# Patient Record
Sex: MALE | Race: BLACK OR AFRICAN AMERICAN
[De-identification: names, ages, dates, MRNs, and addresses within clinical notes are randomized per-mention and may not be internally consistent; named-entity substitution may affect disease eponyms.]

---

## 2024-04-18 ENCOUNTER — HOSPITAL ENCOUNTER (OUTPATIENT)
Facility: HOSPITAL | Age: 44
End: 2024-04-18
Payer: MEDICAID

## 2024-04-18 ENCOUNTER — HOSPITAL ENCOUNTER (OUTPATIENT)
Facility: HOSPITAL | Age: 44
Setting detail: OBSERVATION
Discharge: HOME | End: 2024-04-18
Attending: INTERNAL MEDICINE | Admitting: INTERNAL MEDICINE
Payer: MEDICAID

## 2024-04-18 VITALS
DIASTOLIC BLOOD PRESSURE: 101 MMHG | RESPIRATION RATE: 18 BRPM | OXYGEN SATURATION: 97 % | HEART RATE: 85 BPM | TEMPERATURE: 98.4 F | SYSTOLIC BLOOD PRESSURE: 161 MMHG

## 2024-04-18 VITALS
BODY MASS INDEX: 41.28 KG/M2 | WEIGHT: 263 LBS | HEART RATE: 74 BPM | OXYGEN SATURATION: 98 % | DIASTOLIC BLOOD PRESSURE: 95 MMHG | RESPIRATION RATE: 17 BRPM | HEIGHT: 67 IN | TEMPERATURE: 97.8 F | SYSTOLIC BLOOD PRESSURE: 139 MMHG

## 2024-04-18 DIAGNOSIS — N18.4 CHRONIC KIDNEY DISEASE, STAGE IV (SEVERE) (HCC): ICD-10-CM

## 2024-04-18 DIAGNOSIS — D63.1 ANEMIA OF CHRONIC KIDNEY FAILURE, UNSPECIFIED STAGE: ICD-10-CM

## 2024-04-18 DIAGNOSIS — R80.9 PROTEINURIA, UNSPECIFIED TYPE: ICD-10-CM

## 2024-04-18 DIAGNOSIS — I10 ESSENTIAL HYPERTENSION, MALIGNANT: ICD-10-CM

## 2024-04-18 DIAGNOSIS — N18.9 ANEMIA OF CHRONIC KIDNEY FAILURE, UNSPECIFIED STAGE: ICD-10-CM

## 2024-04-18 LAB
ABO + RH BLD: NORMAL
BLOOD GROUP ANTIBODIES SERPL: NORMAL
COMMENT:: NORMAL
ERYTHROCYTE [DISTWIDTH] IN BLOOD BY AUTOMATED COUNT: 13.3 % (ref 11.5–14.5)
HCT VFR BLD AUTO: 31.9 % (ref 36.6–50.3)
HCT VFR BLD AUTO: 35.5 % (ref 36.6–50.3)
HGB BLD-MCNC: 11.4 G/DL (ref 12.1–17)
HGB BLD-MCNC: 12.4 G/DL (ref 12.1–17)
INR PPP: 1 (ref 0.9–1.1)
MCH RBC QN AUTO: 28.6 PG (ref 26–34)
MCHC RBC AUTO-ENTMCNC: 34.9 G/DL (ref 30–36.5)
MCV RBC AUTO: 82 FL (ref 80–99)
NRBC # BLD: 0 K/UL (ref 0–0.01)
NRBC BLD-RTO: 0 PER 100 WBC
PLATELET # BLD AUTO: 365 K/UL (ref 150–400)
PMV BLD AUTO: 9.2 FL (ref 8.9–12.9)
PROTHROMBIN TIME: 10.2 SEC (ref 9–11.1)
RBC # BLD AUTO: 4.33 M/UL (ref 4.1–5.7)
SPECIMEN EXP DATE BLD: NORMAL
SPECIMEN HOLD: NORMAL
WBC # BLD AUTO: 8.7 K/UL (ref 4.1–11.1)

## 2024-04-18 PROCEDURE — 86900 BLOOD TYPING SEROLOGIC ABO: CPT

## 2024-04-18 PROCEDURE — 85027 COMPLETE CBC AUTOMATED: CPT

## 2024-04-18 PROCEDURE — 85610 PROTHROMBIN TIME: CPT

## 2024-04-18 PROCEDURE — 6370000000 HC RX 637 (ALT 250 FOR IP): Performed by: INTERNAL MEDICINE

## 2024-04-18 PROCEDURE — 85018 HEMOGLOBIN: CPT

## 2024-04-18 PROCEDURE — 6370000000 HC RX 637 (ALT 250 FOR IP): Performed by: NURSE PRACTITIONER

## 2024-04-18 PROCEDURE — 86901 BLOOD TYPING SEROLOGIC RH(D): CPT

## 2024-04-18 PROCEDURE — 88305 TISSUE EXAM BY PATHOLOGIST: CPT

## 2024-04-18 PROCEDURE — 86850 RBC ANTIBODY SCREEN: CPT

## 2024-04-18 PROCEDURE — 99153 MOD SED SAME PHYS/QHP EA: CPT

## 2024-04-18 PROCEDURE — 6360000002 HC RX W HCPCS: Performed by: PHYSICIAN ASSISTANT

## 2024-04-18 PROCEDURE — G0378 HOSPITAL OBSERVATION PER HR: HCPCS

## 2024-04-18 PROCEDURE — 36415 COLL VENOUS BLD VENIPUNCTURE: CPT

## 2024-04-18 PROCEDURE — G0379 DIRECT REFER HOSPITAL OBSERV: HCPCS

## 2024-04-18 PROCEDURE — 85014 HEMATOCRIT: CPT

## 2024-04-18 RX ORDER — ACETAMINOPHEN 500 MG
1000 TABLET ORAL ONCE
Status: COMPLETED | OUTPATIENT
Start: 2024-04-18 | End: 2024-04-18

## 2024-04-18 RX ORDER — HYDROXYZINE HYDROCHLORIDE 25 MG/1
25 TABLET, FILM COATED ORAL PRN
COMMUNITY

## 2024-04-18 RX ORDER — MIDAZOLAM HYDROCHLORIDE 2 MG/2ML
INJECTION, SOLUTION INTRAMUSCULAR; INTRAVENOUS PRN
Status: COMPLETED | OUTPATIENT
Start: 2024-04-18 | End: 2024-04-18

## 2024-04-18 RX ORDER — FENTANYL CITRATE 50 UG/ML
INJECTION, SOLUTION INTRAMUSCULAR; INTRAVENOUS PRN
Status: COMPLETED | OUTPATIENT
Start: 2024-04-18 | End: 2024-04-18

## 2024-04-18 RX ORDER — CLONIDINE HYDROCHLORIDE 0.1 MG/1
0.2 TABLET ORAL EVERY 4 HOURS PRN
Status: DISCONTINUED | OUTPATIENT
Start: 2024-04-18 | End: 2024-04-18 | Stop reason: HOSPADM

## 2024-04-18 RX ORDER — CLONIDINE HYDROCHLORIDE 0.1 MG/1
0.1 TABLET ORAL ONCE
Status: COMPLETED | OUTPATIENT
Start: 2024-04-18 | End: 2024-04-18

## 2024-04-18 RX ORDER — SODIUM CHLORIDE 0.9 % (FLUSH) 0.9 %
5-40 SYRINGE (ML) INJECTION EVERY 12 HOURS SCHEDULED
Status: DISCONTINUED | OUTPATIENT
Start: 2024-04-18 | End: 2024-04-18 | Stop reason: HOSPADM

## 2024-04-18 RX ORDER — PHENYTOIN SODIUM 100 MG/1
100 CAPSULE, EXTENDED RELEASE ORAL
COMMUNITY

## 2024-04-18 RX ORDER — SODIUM CHLORIDE 9 MG/ML
INJECTION, SOLUTION INTRAVENOUS PRN
Status: DISCONTINUED | OUTPATIENT
Start: 2024-04-18 | End: 2024-04-18 | Stop reason: HOSPADM

## 2024-04-18 RX ORDER — SODIUM CHLORIDE 0.9 % (FLUSH) 0.9 %
5-40 SYRINGE (ML) INJECTION PRN
Status: DISCONTINUED | OUTPATIENT
Start: 2024-04-18 | End: 2024-04-18 | Stop reason: HOSPADM

## 2024-04-18 RX ADMIN — MIDAZOLAM HYDROCHLORIDE 1 MG: 1 INJECTION, SOLUTION INTRAMUSCULAR; INTRAVENOUS at 13:13

## 2024-04-18 RX ADMIN — MIDAZOLAM HYDROCHLORIDE 1 MG: 1 INJECTION, SOLUTION INTRAMUSCULAR; INTRAVENOUS at 12:50

## 2024-04-18 RX ADMIN — CLONIDINE HYDROCHLORIDE 0.1 MG: 0.1 TABLET ORAL at 21:06

## 2024-04-18 RX ADMIN — FENTANYL CITRATE 50 MCG: 0.05 INJECTION, SOLUTION INTRAMUSCULAR; INTRAVENOUS at 12:41

## 2024-04-18 RX ADMIN — CLONIDINE HYDROCHLORIDE 0.2 MG: 0.1 TABLET ORAL at 19:29

## 2024-04-18 RX ADMIN — FENTANYL CITRATE 50 MCG: 0.05 INJECTION, SOLUTION INTRAMUSCULAR; INTRAVENOUS at 12:50

## 2024-04-18 RX ADMIN — MIDAZOLAM HYDROCHLORIDE 1 MG: 1 INJECTION, SOLUTION INTRAMUSCULAR; INTRAVENOUS at 12:55

## 2024-04-18 RX ADMIN — ACETAMINOPHEN 1000 MG: 500 TABLET ORAL at 20:56

## 2024-04-18 RX ADMIN — MIDAZOLAM HYDROCHLORIDE 1 MG: 1 INJECTION, SOLUTION INTRAMUSCULAR; INTRAVENOUS at 12:42

## 2024-04-18 RX ADMIN — MIDAZOLAM HYDROCHLORIDE 1 MG: 1 INJECTION, SOLUTION INTRAMUSCULAR; INTRAVENOUS at 13:01

## 2024-04-18 RX ADMIN — CLONIDINE HYDROCHLORIDE 0.2 MG: 0.1 TABLET ORAL at 09:20

## 2024-04-18 ASSESSMENT — PAIN - FUNCTIONAL ASSESSMENT: PAIN_FUNCTIONAL_ASSESSMENT: NONE - DENIES PAIN

## 2024-04-18 NOTE — H&P
Interventional Radiology History and Physical      Patient: Ward Bhatia 43 y.o. male  [id number]    Consult Requested by:  Dagoberto Smith*    Chief Complaint: chronic kidney disease    History of Present Illness: 44 yo male with the above cc presents for image-guided renal biopsy.    History:  No past medical history on file.  No family history on file.  Social History     Socioeconomic History    Marital status: Single     Spouse name: Not on file    Number of children: Not on file    Years of education: Not on file    Highest education level: Not on file   Occupational History    Not on file   Tobacco Use    Smoking status: Not on file    Smokeless tobacco: Not on file   Substance and Sexual Activity    Alcohol use: Not on file    Drug use: Not on file    Sexual activity: Not on file   Other Topics Concern    Not on file   Social History Narrative    Not on file     Social Determinants of Health     Financial Resource Strain: Not on file   Food Insecurity: Not on file   Transportation Needs: Not on file   Physical Activity: Not on file   Stress: Not on file   Social Connections: Not on file   Intimate Partner Violence: Not on file   Housing Stability: Not on file       Allergies: No Known Allergies    Current Medications:  No current facility-administered medications for this encounter.     No current outpatient medications on file.     Facility-Administered Medications Ordered in Other Encounters   Medication Dose Route Frequency    sodium chloride flush 0.9 % injection 5-40 mL  5-40 mL IntraVENous 2 times per day    sodium chloride flush 0.9 % injection 5-40 mL  5-40 mL IntraVENous PRN    0.9 % sodium chloride infusion   IntraVENous PRN    cloNIDine (CATAPRES) tablet 0.2 mg  0.2 mg Oral Q4H PRN        Review of Systems:  Patient denies fever, chills, cough, headache, vision changes, difficulty swallowing, shortness of breath, chest pain, abdominal pain, nausea, vomiting, changes in bladder or bowel

## 2024-04-18 NOTE — H&P
NEPHROLOGY H&P     Patient: Ward Bhatia MRN: [id number]  PCP: No primary care provider on file.   :     1980  Age:   43 y.o.  Sex:  male      Referring physician: Dagoberto Smith,*  Reason for consultation: 43 y.o. male with Proteinuria [R80.9] complicated by PEMA   Admission Date: 2024  7:24 AM  LOS: 0 days      ASSESSMENT and PLAN :   Progressive CKD IV  Nephrotic-range proteinuria: diff includes MN, FSGS   HTN      Plan:  Renal bx today  Clonidine 0.2mg x 1 now and PRN for SBP > 150  H&H 6 hrs post bx  Home post bx     Active Problems / Assessment AAActive  :   Principal Problem:    Proteinuria  Resolved Problems:    * No resolved hospital problems. *       Subjective:   HPI: Ward Bhatia is a 43 y.o.  male who has been admitted to the hospital for an elective renal biopsy.  He has a hx of progressive CKD, no hx of DM or HTN.  Nephrotic-range proteinuria. Neg serologic w/up.  Nervous, otherwise feeling ok.  No cp, sob, n/v/d reported.    Past Medical Hx:   No past medical history on file.     Past Surgical Hx:   No past surgical history on file.    Medications:  Prior to Admission medications    Not on File       No Known Allergies    Social Hx:       No family history on file.    Review of Systems:  A twelve point review of system was performed today. Pertinent positives and negatives are mentioned in the HPI. The reminder of the ROS is negative and noncontributory.     Objective:    Vitals:    Vitals:    24 0731 24 0808   BP: 126/70 (!) 163/112   Pulse: 97 93   Resp:  20   Temp: 98.2 °F (36.8 °C) 98.4 °F (36.9 °C)   TempSrc: Oral Oral   SpO2: 95% 98%     I&O's:  No intake/output data recorded.  BP (!) 163/112   Pulse 93   Temp 98.4 °F (36.9 °C) (Oral)   Resp 20   SpO2 98%     Physical Exam:  General:Alert, No distress,   HEENT: Eyes are PERRL.  Conjunctiva without pallor ,erythema.  The sclerae without icterus. .   Neck:Supple,no mass palpable  Lungs

## 2024-04-18 NOTE — PROGRESS NOTES
Seen post biopsy  VSS, pt doing well  Plan H&H 7pm  D/c home if hgb stable  Will call patient with biopsy results

## 2024-04-18 NOTE — PROGRESS NOTES
11:00  AM  In chart for IR pre procedural review. Labs reviewed and WNL to proceed with biopsy. Awaiting new BP to be documented post medication for target range of SBP <150. Notified floor.

## 2024-04-19 NOTE — PROGRESS NOTES
2000-Paged on call nephrologist due to BP being high after giving one time dose of Clonidine @1930.  /101 HR 85.  Dr. Medina order another one time dose of 0.1 mg of Clonidine and stated it was still okay for pt to discharge.      2115-One time dose of Clonidine administered and discharge paper work reviewed with pt and parents. Both verbalized understanding and no further questions.  IV removed and pt walked self down to discharge area with PCT.  PT refused wheelchair.  Ambulated with no assistance and no gait disturbance.

## 2025-02-07 ENCOUNTER — APPOINTMENT (OUTPATIENT)
Facility: HOSPITAL | Age: 45
End: 2025-02-07
Attending: INTERNAL MEDICINE
Payer: MEDICARE

## 2025-02-07 ENCOUNTER — HOSPITAL ENCOUNTER (INPATIENT)
Facility: HOSPITAL | Age: 45
LOS: 8 days | Discharge: HOME | End: 2025-02-15
Attending: EMERGENCY MEDICINE | Admitting: FAMILY MEDICINE
Payer: MEDICARE

## 2025-02-07 ENCOUNTER — APPOINTMENT (OUTPATIENT)
Facility: HOSPITAL | Age: 45
End: 2025-02-07
Payer: MEDICARE

## 2025-02-07 DIAGNOSIS — E83.51 HYPOCALCEMIA: ICD-10-CM

## 2025-02-07 DIAGNOSIS — N17.9 ACUTE RENAL FAILURE, UNSPECIFIED ACUTE RENAL FAILURE TYPE: Primary | ICD-10-CM

## 2025-02-07 PROBLEM — N18.9 ACUTE KIDNEY INJURY SUPERIMPOSED ON CKD (HCC): Status: ACTIVE | Noted: 2025-02-07

## 2025-02-07 LAB
ALBUMIN SERPL-MCNC: 3.2 G/DL (ref 3.5–5)
ALBUMIN/GLOB SERPL: 0.8 (ref 1.1–2.2)
ALP SERPL-CCNC: 124 U/L (ref 45–117)
ALT SERPL-CCNC: 32 U/L (ref 12–78)
ANION GAP SERPL CALC-SCNC: 21 MMOL/L (ref 2–12)
APPEARANCE UR: CLEAR
AST SERPL-CCNC: 21 U/L (ref 15–37)
BACTERIA URNS QL MICRO: NEGATIVE /HPF
BASOPHILS # BLD: 0.02 K/UL (ref 0–0.1)
BASOPHILS NFR BLD: 0.4 % (ref 0–1)
BILIRUB SERPL-MCNC: 0.5 MG/DL (ref 0.2–1)
BILIRUB UR QL: NEGATIVE
BUN SERPL-MCNC: 155 MG/DL (ref 6–20)
BUN/CREAT SERPL: 9 (ref 12–20)
CALCIUM SERPL-MCNC: 6.4 MG/DL (ref 8.5–10.1)
CHLORIDE SERPL-SCNC: 106 MMOL/L (ref 97–108)
CO2 SERPL-SCNC: 9 MMOL/L (ref 21–32)
COLOR UR: ABNORMAL
COMMENT:: NORMAL
CREAT SERPL-MCNC: 16.5 MG/DL (ref 0.7–1.3)
DIFFERENTIAL METHOD BLD: ABNORMAL
EKG ATRIAL RATE: 95 BPM
EKG DIAGNOSIS: NORMAL
EKG P AXIS: 58 DEGREES
EKG P-R INTERVAL: 158 MS
EKG Q-T INTERVAL: 374 MS
EKG QRS DURATION: 72 MS
EKG QTC CALCULATION (BAZETT): 469 MS
EKG R AXIS: 51 DEGREES
EKG T AXIS: 17 DEGREES
EKG VENTRICULAR RATE: 95 BPM
EOSINOPHIL # BLD: 0.02 K/UL (ref 0–0.4)
EOSINOPHIL NFR BLD: 0.4 % (ref 0–7)
EPITH CASTS URNS QL MICRO: ABNORMAL /LPF
ERYTHROCYTE [DISTWIDTH] IN BLOOD BY AUTOMATED COUNT: 13.4 % (ref 11.5–14.5)
GLOBULIN SER CALC-MCNC: 4 G/DL (ref 2–4)
GLUCOSE SERPL-MCNC: 122 MG/DL (ref 65–100)
GLUCOSE UR STRIP.AUTO-MCNC: 100 MG/DL
HCT VFR BLD AUTO: 28 % (ref 36.6–50.3)
HGB BLD-MCNC: 9.3 G/DL (ref 12.1–17)
HGB UR QL STRIP: ABNORMAL
IMM GRANULOCYTES # BLD AUTO: 0.02 K/UL (ref 0–0.04)
IMM GRANULOCYTES NFR BLD AUTO: 0.4 % (ref 0–0.5)
KETONES UR QL STRIP.AUTO: NEGATIVE MG/DL
LEUKOCYTE ESTERASE UR QL STRIP.AUTO: ABNORMAL
LYMPHOCYTES # BLD: 0.97 K/UL (ref 0.8–3.5)
LYMPHOCYTES NFR BLD: 18.5 % (ref 12–49)
MCH RBC QN AUTO: 27.3 PG (ref 26–34)
MCHC RBC AUTO-ENTMCNC: 33.2 G/DL (ref 30–36.5)
MCV RBC AUTO: 82.1 FL (ref 80–99)
MONOCYTES # BLD: 0.47 K/UL (ref 0–1)
MONOCYTES NFR BLD: 9 % (ref 5–13)
NEUTS SEG # BLD: 3.73 K/UL (ref 1.8–8)
NEUTS SEG NFR BLD: 71.3 % (ref 32–75)
NITRITE UR QL STRIP.AUTO: NEGATIVE
NRBC # BLD: 0 K/UL (ref 0–0.01)
NRBC BLD-RTO: 0 PER 100 WBC
PH UR STRIP: 5 (ref 5–8)
PLATELET # BLD AUTO: 214 K/UL (ref 150–400)
PMV BLD AUTO: 10.1 FL (ref 8.9–12.9)
POTASSIUM SERPL-SCNC: 5 MMOL/L (ref 3.5–5.1)
PROT SERPL-MCNC: 7.2 G/DL (ref 6.4–8.2)
PROT UR STRIP-MCNC: 300 MG/DL
RBC # BLD AUTO: 3.41 M/UL (ref 4.1–5.7)
RBC #/AREA URNS HPF: ABNORMAL /HPF (ref 0–5)
SODIUM SERPL-SCNC: 136 MMOL/L (ref 136–145)
SP GR UR REFRACTOMETRY: 1.01 (ref 1–1.03)
SPECIMEN HOLD: NORMAL
UROBILINOGEN UR QL STRIP.AUTO: 0.2 EU/DL (ref 0.2–1)
WBC # BLD AUTO: 5.2 K/UL (ref 4.1–11.1)
WBC URNS QL MICRO: ABNORMAL /HPF (ref 0–4)

## 2025-02-07 PROCEDURE — 71045 X-RAY EXAM CHEST 1 VIEW: CPT

## 2025-02-07 PROCEDURE — 93010 ELECTROCARDIOGRAM REPORT: CPT | Performed by: SPECIALIST

## 2025-02-07 PROCEDURE — 2060000000 HC ICU INTERMEDIATE R&B

## 2025-02-07 PROCEDURE — 81001 URINALYSIS AUTO W/SCOPE: CPT

## 2025-02-07 PROCEDURE — 99285 EMERGENCY DEPT VISIT HI MDM: CPT

## 2025-02-07 PROCEDURE — 05HM33Z INSERTION OF INFUSION DEVICE INTO RIGHT INTERNAL JUGULAR VEIN, PERCUTANEOUS APPROACH: ICD-10-PCS | Performed by: STUDENT IN AN ORGANIZED HEALTH CARE EDUCATION/TRAINING PROGRAM

## 2025-02-07 PROCEDURE — 93005 ELECTROCARDIOGRAM TRACING: CPT | Performed by: EMERGENCY MEDICINE

## 2025-02-07 PROCEDURE — 6370000000 HC RX 637 (ALT 250 FOR IP): Performed by: FAMILY MEDICINE

## 2025-02-07 PROCEDURE — 6360000002 HC RX W HCPCS: Performed by: FAMILY MEDICINE

## 2025-02-07 PROCEDURE — 80053 COMPREHEN METABOLIC PANEL: CPT

## 2025-02-07 PROCEDURE — 6360000002 HC RX W HCPCS

## 2025-02-07 PROCEDURE — 76937 US GUIDE VASCULAR ACCESS: CPT

## 2025-02-07 PROCEDURE — 36415 COLL VENOUS BLD VENIPUNCTURE: CPT

## 2025-02-07 PROCEDURE — 76770 US EXAM ABDO BACK WALL COMP: CPT

## 2025-02-07 PROCEDURE — 2500000003 HC RX 250 WO HCPCS: Performed by: FAMILY MEDICINE

## 2025-02-07 PROCEDURE — 85025 COMPLETE CBC W/AUTO DIFF WBC: CPT

## 2025-02-07 RX ORDER — ACETAMINOPHEN 650 MG/1
650 SUPPOSITORY RECTAL EVERY 6 HOURS PRN
Status: DISCONTINUED | OUTPATIENT
Start: 2025-02-07 | End: 2025-02-15 | Stop reason: HOSPADM

## 2025-02-07 RX ORDER — ONDANSETRON 4 MG/1
4 TABLET, ORALLY DISINTEGRATING ORAL EVERY 8 HOURS PRN
Status: DISCONTINUED | OUTPATIENT
Start: 2025-02-07 | End: 2025-02-15 | Stop reason: HOSPADM

## 2025-02-07 RX ORDER — AMLODIPINE BESYLATE 5 MG/1
5 TABLET ORAL DAILY
Status: DISCONTINUED | OUTPATIENT
Start: 2025-02-08 | End: 2025-02-10

## 2025-02-07 RX ORDER — LIDOCAINE HYDROCHLORIDE 10 MG/ML
INJECTION, SOLUTION EPIDURAL; INFILTRATION; INTRACAUDAL; PERINEURAL PRN
Status: COMPLETED | OUTPATIENT
Start: 2025-02-07 | End: 2025-02-07

## 2025-02-07 RX ORDER — PHENYTOIN SODIUM 100 MG/1
100 CAPSULE, EXTENDED RELEASE ORAL
Status: DISCONTINUED | OUTPATIENT
Start: 2025-02-07 | End: 2025-02-08

## 2025-02-07 RX ORDER — HYDRALAZINE HYDROCHLORIDE 20 MG/ML
5 INJECTION INTRAMUSCULAR; INTRAVENOUS EVERY 6 HOURS PRN
Status: DISCONTINUED | OUTPATIENT
Start: 2025-02-07 | End: 2025-02-08

## 2025-02-07 RX ORDER — SODIUM CHLORIDE 9 MG/ML
INJECTION, SOLUTION INTRAVENOUS PRN
Status: DISCONTINUED | OUTPATIENT
Start: 2025-02-07 | End: 2025-02-15 | Stop reason: HOSPADM

## 2025-02-07 RX ORDER — LABETALOL HYDROCHLORIDE 5 MG/ML
5 INJECTION, SOLUTION INTRAVENOUS ONCE
Status: COMPLETED | OUTPATIENT
Start: 2025-02-07 | End: 2025-02-07

## 2025-02-07 RX ORDER — ACETAMINOPHEN 325 MG/1
650 TABLET ORAL EVERY 6 HOURS PRN
Status: DISCONTINUED | OUTPATIENT
Start: 2025-02-07 | End: 2025-02-15 | Stop reason: HOSPADM

## 2025-02-07 RX ORDER — HYDRALAZINE HYDROCHLORIDE 20 MG/ML
10 INJECTION INTRAMUSCULAR; INTRAVENOUS ONCE
Status: COMPLETED | OUTPATIENT
Start: 2025-02-07 | End: 2025-02-08

## 2025-02-07 RX ORDER — SODIUM CHLORIDE 0.9 % (FLUSH) 0.9 %
5-40 SYRINGE (ML) INJECTION EVERY 12 HOURS SCHEDULED
Status: DISCONTINUED | OUTPATIENT
Start: 2025-02-07 | End: 2025-02-15 | Stop reason: HOSPADM

## 2025-02-07 RX ORDER — SODIUM CHLORIDE 0.9 % (FLUSH) 0.9 %
5-40 SYRINGE (ML) INJECTION PRN
Status: DISCONTINUED | OUTPATIENT
Start: 2025-02-07 | End: 2025-02-15 | Stop reason: HOSPADM

## 2025-02-07 RX ORDER — HEPARIN SODIUM 1000 [USP'U]/ML
INJECTION, SOLUTION INTRAVENOUS; SUBCUTANEOUS PRN
Status: COMPLETED | OUTPATIENT
Start: 2025-02-07 | End: 2025-02-07

## 2025-02-07 RX ORDER — POLYETHYLENE GLYCOL 3350 17 G/17G
17 POWDER, FOR SOLUTION ORAL DAILY PRN
Status: DISCONTINUED | OUTPATIENT
Start: 2025-02-07 | End: 2025-02-15 | Stop reason: HOSPADM

## 2025-02-07 RX ORDER — ONDANSETRON 2 MG/ML
4 INJECTION INTRAMUSCULAR; INTRAVENOUS EVERY 6 HOURS PRN
Status: DISCONTINUED | OUTPATIENT
Start: 2025-02-07 | End: 2025-02-15 | Stop reason: HOSPADM

## 2025-02-07 RX ADMIN — SODIUM CHLORIDE, PRESERVATIVE FREE 10 ML: 5 INJECTION INTRAVENOUS at 20:49

## 2025-02-07 RX ADMIN — SODIUM CHLORIDE, PRESERVATIVE FREE 10 ML: 5 INJECTION INTRAVENOUS at 20:50

## 2025-02-07 RX ADMIN — LABETALOL HYDROCHLORIDE 5 MG: 5 INJECTION INTRAVENOUS at 19:06

## 2025-02-07 RX ADMIN — PHENYTOIN SODIUM 100 MG: 100 CAPSULE, EXTENDED RELEASE ORAL at 17:45

## 2025-02-07 RX ADMIN — HYDRALAZINE HYDROCHLORIDE 5 MG: 20 INJECTION INTRAMUSCULAR; INTRAVENOUS at 16:12

## 2025-02-07 RX ADMIN — HEPARIN SODIUM 2600 UNITS: 1000 INJECTION INTRAVENOUS; SUBCUTANEOUS at 16:36

## 2025-02-07 RX ADMIN — LIDOCAINE HYDROCHLORIDE 2 ML: 10 INJECTION, SOLUTION EPIDURAL; INFILTRATION; INTRACAUDAL; PERINEURAL at 16:27

## 2025-02-07 ASSESSMENT — PAIN SCALES - GENERAL: PAINLEVEL_OUTOF10: 0

## 2025-02-07 NOTE — PROCEDURES
PROCEDURE NOTE  Date: 2/7/2025   Name: Ward Bhatia  YOB: 1980    Interventional Radiology Brief Postprocedure Note    Procedure Date:  2/7/2025    Procedure:  US guided insertion of a non tunneled dialysis catheter    :  Minna Toledo NP    Attending:  Anju Sweeney MD    Preoperative Diagnosis:   CKD IV     Postoperative Diagnosis:  Same    Complications:  None    Estimated Blood Loss:  < 5 ml    Procedure Findings:  Technically successful placement of a right internal jugular non tunneled dialysis catheter. Cathter may be used once placement is confirmed. Pending chest x-ray.    Specimens:  None    Condition of Patient:  The patient tolerated the procedure without difficulty and remained in stable condition throughout.

## 2025-02-07 NOTE — ED PROVIDER NOTES
wheezing, rhonchi or rales.   Abdominal:      General: Abdomen is flat. Bowel sounds are normal.      Palpations: Abdomen is soft.      Tenderness: There is no abdominal tenderness. There is no guarding.   Musculoskeletal:         General: No swelling or tenderness.      Cervical back: Normal range of motion. No rigidity or tenderness.   Skin:     General: Skin is warm and dry.      Capillary Refill: Capillary refill takes 2 to 3 seconds.      Coloration: Skin is not pale.   Neurological:      General: No focal deficit present.      Mental Status: He is alert and oriented to person, place, and time.      Cranial Nerves: No cranial nerve deficit.      Sensory: No sensory deficit.      Motor: No weakness.      Coordination: Coordination normal.   Psychiatric:      Comments: Patient is not very conversant         DIAGNOSTIC RESULTS     EKG:   [facility] EKG interpretation: There is a normal sinus rhythm at 95 bpm.  No ectopy is noted.  Axis and intervals are normal.  No ischemia is noted. Ward Sweeney MD        RADIOLOGY:           No orders to display        LABS:  Labs Reviewed   CBC WITH AUTO DIFFERENTIAL - Abnormal; Notable for the following components:       Result Value    RBC 3.41 (*)     Hemoglobin 9.3 (*)     Hematocrit 28.0 (*)     All other components within normal limits   COMPREHENSIVE METABOLIC PANEL   URINALYSIS WITH MICROSCOPIC       All other labs were within normal range or not returned as of this dictation.    EMERGENCY DEPARTMENT COURSE and DIFFERENTIAL DIAGNOSIS/MDM:   Vitals:    Vitals:    02/07/25 0918   BP: (!) 199/101   Pulse: 100   Resp: 18   Temp: 99.3 °F (37.4 °C)   TempSrc: Oral   SpO2: 100%   Weight: 114.6 kg (252 lb 10.4 oz)   Height: 1.702 m (5' 7\")           Medical Decision Making  This is a 44-year-old male who has a history of renal disease being followed by Dr. Smith over the last year or so.  The family has noticed some increasing lethargy and weakness in the last several weeks.   Appetite is down.  He has had no fever or chill and denies any other complaints including chest pain, abdominal pain, nausea or vomiting and no change in urinary or bowel habits.  He has had no worsening of edema.  His physical exam is nonacute.  Patient is a little withdrawn.  Vital signs are as noted.  Heart and lung exam is negative.  No neck vein distention is noted.  Abdomen is somewhat protuberant but soft and nontender.  Mild edema in the lower extremities but nontender.  I do not have the patient's labs from yesterday that prompted his visit to the ED.  His CBC and comprehensive panel are being repeated today.  Upon completion of those studies I will discuss with Dr. Smith.  My presumption is that the patient will need to be admitted for further evaluation of worsening renal function.    Amount and/or Complexity of Data Reviewed  Labs: ordered. Decision-making details documented in ED Course.  Radiology: ordered.  ECG/medicine tests: ordered.    Risk  Decision regarding hospitalization.            REASSESSMENT      Patient's labs are returned and demonstrates CO2 of 9 and, calcium of just over 6 and a creatinine of 16-1/2.  BUN is still pending.  I have sent Dr. Smith a note in them assuming the patient will be admitted to the hospitalist service.  Have asked if he wanted any imaging of the patient while in the ED.  Patient is in no acute distress in the ED.    Dr. Smith would like the patient admitted to the hospitalist service for further evaluation and management of this worsening of renal failure.    Perfect Serve Consult for Admission  12:24 PM    ED Room Number: ER17/17  Patient Name and age:  Ward Bhatia 44 y.o.  male  Working Diagnosis:   1. Acute renal failure, unspecified acute renal failure type (HCC)    2. Hypocalcemia        COVID-19 Suspicion: No  Sepsis present:  No  Reassessment needed: No  Code Status:  Full Code  Readmission: No  Isolation Requirements: no  Recommended Level of Care:

## 2025-02-07 NOTE — ED NOTES
Informed consent reviewed with patient and guardian by MD Smith. Patient and guardians verbalized understanding. Both patient and mother signed consent, this RN as witness. Consent scanned into chart.

## 2025-02-07 NOTE — PROGRESS NOTES
Camilo placed at bedside with Minna Toledo NP. CXR placed and ED RN informed not to allow line to be used until placement has been confirmed via xray. Report given to Coleen FRAZIER, RN aware that pt's pressure remains high despite 5mg hydralazine given and that site may ooze r/t high BP. Mother brought back to bedside and aware of successful completion of procedure.

## 2025-02-07 NOTE — H&P
History and Physical    Date of Service:  2/7/2025  Primary Care Provider: No primary care provider on file.  Source of information: patient, electronic medical record    Chief Complaint: Abnormal Lab      History of Presenting Illness:   Ward Bhatia is a 44 y.o. male with a pmhx IfA nephropathy who presents with poor appetite, and fatigue for the past week and is being admitted for PEMA on CKD.    In the ED, BP was elevated to 199/101.  Labs showed normocytic anemia with Hgb 9.3 (b/l 11.4), CO2 9, AG 21, creatinine 16.5, and Ca2+ 6.4.      In the ED, nephrology was consulted, and plan for initiation of HD      REVIEW OF SYSTEMS:  A comprehensive review of systems was negative except for that written in the History of Present Illness.     No past medical history on file.   Past Surgical History:   Procedure Laterality Date    CT BIOPSY RENAL  4/18/2024    CT BIOPSY RENAL 4/18/2024 [facility] RAD CT     Prior to Admission medications    Medication Sig Start Date End Date Taking? Authorizing Provider   hydrOXYzine HCl (ATARAX) 25 MG tablet Take 1 tablet by mouth as needed    ProviderLayton MD   phenytoin (DILANTIN) 100 MG ER capsule Take 1 capsule by mouth 5 times daily    ProviderLayton MD     No Known Allergies   No family history on file.   Social History:     Social Determinants of Health     Tobacco Use: Low Risk  (10/2/2024)    Received from [facility]    Patient History     Smoking Tobacco Use: Never     Smokeless Tobacco Use: Never     Passive Exposure: Never   Alcohol Use: Not on file   Financial Resource Strain: Not on file   Food Insecurity: Not on file   Transportation Needs: Not on file   Physical Activity: Not on file   Stress: Not on file   Social Connections: Not on file   Intimate Partner Violence: Not on file   Depression: Not on file   Housing Stability: Not on file   Interpersonal Safety: Not on file   Utilities: Not on file        Medications were reconciled to

## 2025-02-07 NOTE — ED NOTES
9:22 AM  I have evaluated the patient as the Provider in Rapid Medical Evaluation (RME). I have reviewed his vital signs and the triage nurse assessment. I have talked with the patient and any available family and advised that I am the provider in triage and have ordered the appropriate study to initiate their work up based on the clinical presentation during my assessment. I have advised that the patient will be accommodated in the Main ED as soon as possible. I have also requested to contact the triage nurse or myself immediately if the patient experiences any changes in their condition during this brief waiting period.    Ward Bhatia is a 44 y.o. male with a PMHx of renal failure with IgA nephropathy who presents to the emergency department for worsening renal function per his nephrologist. Reports poor appetite and fatigue for the past week. Denies any abdominal pain. He stills makes urine.       TAMAR Rajput, Mihai SIMPSON PA-C  02/07/25 0922

## 2025-02-07 NOTE — CONSULTS
NEPHROLOGY CONSULT NOTE     Patient: Ward Bhatia MRN: [id number]  PCP: No primary care provider on file.   :     1980  Age:   44 y.o.  Sex:  male      Referring physician: Ward Sweeney MD  Reason for consultation: 44 y.o. male with No admission diagnoses are documented for this encounter. 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:  - 2/2 progression of underlying IgA nephropathy  - failed therapy with prednisone, budesonide, SGLT2i therapy  - obtain STAT renal U/S to r/o obstruction  - plan to place alyssa and to initiate dialysis  - HD fri, sat then MWF next week    Hyperkalemia:  - will dialyze today    Metabolic acidosis:  - 2/2 renal failure  - will dialyze as above    IgA nephropathy    Anemia of CKD:  - check iron studies  - start JEROME     Obesity  Anxiety    Discussed with mother at bedside, including risks and benefits and she consents to starting dialysis     Active Problems / Assessment AAActive  :   Active Problems:    * No active hospital problems. *  Resolved Problems:    * No resolved hospital problems. *       Subjective:   HPI: Ward Bhatia is a 44 y.o.  male who has been admitted to the hospital for renal failure.  He has a hx of CKD IV, last Cr 4.2 back in October.  He has bx proven IgA nephropathy with focal cresentic and proliferative features.  He was started on prednisone then changed to oral target release budesonide and SGLT2i.  His Cr from  was 13, k 5.7,  sent in for eval and dialysis initiation.  Cr today 16.  He is voiding.  Poor po intake, increase tremors and lethargy noted over the past month.  No cp, sob, n/v/d reported.      Past Medical Hx:   No past medical history on file.     Past Surgical Hx:     Past Surgical History:   Procedure Laterality Date    CT BIOPSY RENAL  2024    CT BIOPSY RENAL 2024 [facility] RAD CT       Medications:  Prior to Admission medications

## 2025-02-07 NOTE — PROGRESS NOTES
1745 - received report from KARIN Horne    1800 - pt arrives to floor, provider notified of bp 224/119, no new orders. Per ED RN, no calcium replacement ordered for critical calcium of 6.4. PS sent to [facility]    1820 - CXR confirming Camilo placement, Nelda called to initiate HD    1845 - spoke to dialysis RN, eta about 2100, family and pt updated. MD messaged again about bp.    1915 - spoke sarabjit SANCHEZ, one time order of labetolol given    1930 - report given to Consuelo FRAZIER

## 2025-02-07 NOTE — ED TRIAGE NOTES
Arrives ambulatory with mother as referred by Dr. Smith for concerns of kidney failure.  +poor appetite +fatigue.  X 1 week    Mother is legal guardian.

## 2025-02-08 LAB
ALBUMIN SERPL-MCNC: 3.6 G/DL (ref 3.5–5)
ANION GAP SERPL CALC-SCNC: 17 MMOL/L (ref 2–12)
BUN SERPL-MCNC: 109 MG/DL (ref 6–20)
BUN/CREAT SERPL: 9 (ref 12–20)
CALCIUM SERPL-MCNC: 9.1 MG/DL (ref 8.5–10.1)
CHLORIDE SERPL-SCNC: 106 MMOL/L (ref 97–108)
CO2 SERPL-SCNC: 13 MMOL/L (ref 21–32)
CREAT SERPL-MCNC: 11.5 MG/DL (ref 0.7–1.3)
ERYTHROCYTE [DISTWIDTH] IN BLOOD BY AUTOMATED COUNT: 13 % (ref 11.5–14.5)
FERRITIN SERPL-MCNC: 459 NG/ML (ref 26–388)
GLUCOSE BLD STRIP.AUTO-MCNC: 138 MG/DL (ref 65–117)
GLUCOSE SERPL-MCNC: 167 MG/DL (ref 65–100)
HBV SURFACE AB SER QL: NONREACTIVE
HBV SURFACE AB SER-ACNC: <3.1 MIU/ML
HBV SURFACE AG SER QL: <0.1 INDEX
HBV SURFACE AG SER QL: NEGATIVE
HCT VFR BLD AUTO: 28.7 % (ref 36.6–50.3)
HGB BLD-MCNC: 10 G/DL (ref 12.1–17)
IRON SATN MFR SERPL: ABNORMAL % (ref 20–50)
IRON SERPL-MCNC: 160 UG/DL (ref 35–150)
LACTATE SERPL-SCNC: 0.6 MMOL/L (ref 0.4–2)
LACTATE SERPL-SCNC: 3.1 MMOL/L (ref 0.4–2)
MCH RBC QN AUTO: 27 PG (ref 26–34)
MCHC RBC AUTO-ENTMCNC: 34.8 G/DL (ref 30–36.5)
MCV RBC AUTO: 77.4 FL (ref 80–99)
NRBC # BLD: 0 K/UL (ref 0–0.01)
NRBC BLD-RTO: 0 PER 100 WBC
PHENYTOIN FREE SERPL-MCNC: <0.5 UG/ML (ref 1–2)
PHOSPHATE SERPL-MCNC: 5.8 MG/DL (ref 2.6–4.7)
PLATELET # BLD AUTO: 245 K/UL (ref 150–400)
PMV BLD AUTO: 9.5 FL (ref 8.9–12.9)
POTASSIUM SERPL-SCNC: 3.7 MMOL/L (ref 3.5–5.1)
RBC # BLD AUTO: 3.71 M/UL (ref 4.1–5.7)
SERVICE CMNT-IMP: ABNORMAL
SODIUM SERPL-SCNC: 136 MMOL/L (ref 136–145)
TIBC SERPL-MCNC: 146 UG/DL (ref 250–450)
WBC # BLD AUTO: 9.6 K/UL (ref 4.1–11.1)

## 2025-02-08 PROCEDURE — 6360000002 HC RX W HCPCS: Performed by: NURSE PRACTITIONER

## 2025-02-08 PROCEDURE — 80186 ASSAY OF PHENYTOIN FREE: CPT

## 2025-02-08 PROCEDURE — 6360000002 HC RX W HCPCS: Performed by: INTERNAL MEDICINE

## 2025-02-08 PROCEDURE — 93005 ELECTROCARDIOGRAM TRACING: CPT | Performed by: NURSE PRACTITIONER

## 2025-02-08 PROCEDURE — 83605 ASSAY OF LACTIC ACID: CPT

## 2025-02-08 PROCEDURE — 90935 HEMODIALYSIS ONE EVALUATION: CPT

## 2025-02-08 PROCEDURE — 6370000000 HC RX 637 (ALT 250 FOR IP): Performed by: INTERNAL MEDICINE

## 2025-02-08 PROCEDURE — 86706 HEP B SURFACE ANTIBODY: CPT

## 2025-02-08 PROCEDURE — 82728 ASSAY OF FERRITIN: CPT

## 2025-02-08 PROCEDURE — 83540 ASSAY OF IRON: CPT

## 2025-02-08 PROCEDURE — 87340 HEPATITIS B SURFACE AG IA: CPT

## 2025-02-08 PROCEDURE — 83550 IRON BINDING TEST: CPT

## 2025-02-08 PROCEDURE — 5A1D70Z PERFORMANCE OF URINARY FILTRATION, INTERMITTENT, LESS THAN 6 HOURS PER DAY: ICD-10-PCS | Performed by: INTERNAL MEDICINE

## 2025-02-08 PROCEDURE — 85027 COMPLETE CBC AUTOMATED: CPT

## 2025-02-08 PROCEDURE — 2060000000 HC ICU INTERMEDIATE R&B

## 2025-02-08 PROCEDURE — 6370000000 HC RX 637 (ALT 250 FOR IP): Performed by: FAMILY MEDICINE

## 2025-02-08 PROCEDURE — 6360000002 HC RX W HCPCS: Performed by: FAMILY MEDICINE

## 2025-02-08 PROCEDURE — 2500000003 HC RX 250 WO HCPCS: Performed by: FAMILY MEDICINE

## 2025-02-08 PROCEDURE — 2580000003 HC RX 258: Performed by: NURSE PRACTITIONER

## 2025-02-08 PROCEDURE — 82962 GLUCOSE BLOOD TEST: CPT

## 2025-02-08 PROCEDURE — 36415 COLL VENOUS BLD VENIPUNCTURE: CPT

## 2025-02-08 PROCEDURE — 80069 RENAL FUNCTION PANEL: CPT

## 2025-02-08 RX ORDER — LABETALOL HYDROCHLORIDE 5 MG/ML
15 INJECTION, SOLUTION INTRAVENOUS EVERY 4 HOURS PRN
Status: DISCONTINUED | OUTPATIENT
Start: 2025-02-08 | End: 2025-02-15 | Stop reason: HOSPADM

## 2025-02-08 RX ORDER — LEVETIRACETAM 500 MG/5ML
750 INJECTION, SOLUTION, CONCENTRATE INTRAVENOUS ONCE
Status: COMPLETED | OUTPATIENT
Start: 2025-02-08 | End: 2025-02-08

## 2025-02-08 RX ORDER — MANNITOL 250 MG/ML
12.5 INJECTION, SOLUTION INTRAVENOUS
Status: COMPLETED | OUTPATIENT
Start: 2025-02-08 | End: 2025-02-08

## 2025-02-08 RX ORDER — SODIUM BICARBONATE 650 MG/1
1300 TABLET ORAL 2 TIMES DAILY
Status: DISCONTINUED | OUTPATIENT
Start: 2025-02-08 | End: 2025-02-09

## 2025-02-08 RX ORDER — LEVETIRACETAM 500 MG/5ML
500 INJECTION, SOLUTION, CONCENTRATE INTRAVENOUS DAILY
Status: DISCONTINUED | OUTPATIENT
Start: 2025-02-09 | End: 2025-02-15 | Stop reason: HOSPADM

## 2025-02-08 RX ORDER — BUMETANIDE 0.25 MG/ML
2 INJECTION, SOLUTION INTRAMUSCULAR; INTRAVENOUS ONCE
Status: COMPLETED | OUTPATIENT
Start: 2025-02-08 | End: 2025-02-08

## 2025-02-08 RX ORDER — LEVETIRACETAM 500 MG/5ML
500 INJECTION, SOLUTION, CONCENTRATE INTRAVENOUS DAILY PRN
Status: DISCONTINUED | OUTPATIENT
Start: 2025-02-08 | End: 2025-02-09

## 2025-02-08 RX ORDER — HYDRALAZINE HYDROCHLORIDE 20 MG/ML
20 INJECTION INTRAMUSCULAR; INTRAVENOUS EVERY 4 HOURS PRN
Status: DISCONTINUED | OUTPATIENT
Start: 2025-02-08 | End: 2025-02-15 | Stop reason: HOSPADM

## 2025-02-08 RX ORDER — HEPARIN SODIUM 5000 [USP'U]/ML
5000 INJECTION, SOLUTION INTRAVENOUS; SUBCUTANEOUS EVERY 8 HOURS SCHEDULED
Status: DISCONTINUED | OUTPATIENT
Start: 2025-02-08 | End: 2025-02-15 | Stop reason: HOSPADM

## 2025-02-08 RX ADMIN — LEVETIRACETAM 750 MG: 100 INJECTION INTRAVENOUS at 14:25

## 2025-02-08 RX ADMIN — NICARDIPINE HYDROCHLORIDE 15 MG/HR: 0.1 INJECTION, SOLUTION INTRAVENOUS at 11:50

## 2025-02-08 RX ADMIN — MANNITOL 12.5 G: 250 INJECTION, SOLUTION INTRAVENOUS at 16:41

## 2025-02-08 RX ADMIN — NICARDIPINE HYDROCHLORIDE 15 MG/HR: 0.1 INJECTION, SOLUTION INTRAVENOUS at 15:29

## 2025-02-08 RX ADMIN — NICARDIPINE HYDROCHLORIDE 7.5 MG/HR: 0.1 INJECTION, SOLUTION INTRAVENOUS at 08:42

## 2025-02-08 RX ADMIN — ONDANSETRON 4 MG: 2 INJECTION INTRAMUSCULAR; INTRAVENOUS at 17:13

## 2025-02-08 RX ADMIN — SODIUM CHLORIDE, PRESERVATIVE FREE 10 ML: 5 INJECTION INTRAVENOUS at 22:01

## 2025-02-08 RX ADMIN — PHENYTOIN SODIUM 100 MG: 100 CAPSULE, EXTENDED RELEASE ORAL at 07:01

## 2025-02-08 RX ADMIN — SODIUM CHLORIDE, PRESERVATIVE FREE 10 ML: 5 INJECTION INTRAVENOUS at 08:45

## 2025-02-08 RX ADMIN — LEVETIRACETAM 500 MG: 100 INJECTION INTRAVENOUS at 18:52

## 2025-02-08 RX ADMIN — FOSPHENYTOIN SODIUM 100 MG PE: 50 INJECTION, SOLUTION INTRAMUSCULAR; INTRAVENOUS at 01:48

## 2025-02-08 RX ADMIN — ONDANSETRON 4 MG: 2 INJECTION INTRAMUSCULAR; INTRAVENOUS at 09:56

## 2025-02-08 RX ADMIN — NICARDIPINE HYDROCHLORIDE 10 MG/HR: 0.1 INJECTION, SOLUTION INTRAVENOUS at 23:23

## 2025-02-08 RX ADMIN — SODIUM BICARBONATE 1300 MG: 650 TABLET ORAL at 22:01

## 2025-02-08 RX ADMIN — LABETALOL HYDROCHLORIDE 15 MG: 5 INJECTION INTRAVENOUS at 11:53

## 2025-02-08 RX ADMIN — BUMETANIDE 2 MG: 0.25 INJECTION INTRAMUSCULAR; INTRAVENOUS at 01:49

## 2025-02-08 RX ADMIN — NICARDIPINE HYDROCHLORIDE 5 MG/HR: 0.1 INJECTION, SOLUTION INTRAVENOUS at 03:42

## 2025-02-08 RX ADMIN — HEPARIN SODIUM 5000 UNITS: 5000 INJECTION INTRAVENOUS; SUBCUTANEOUS at 22:01

## 2025-02-08 RX ADMIN — HEPARIN SODIUM 5000 UNITS: 5000 INJECTION INTRAVENOUS; SUBCUTANEOUS at 13:57

## 2025-02-08 RX ADMIN — HEPARIN SODIUM 1300 UNITS: 1000 INJECTION INTRAVENOUS; SUBCUTANEOUS at 17:17

## 2025-02-08 RX ADMIN — PHENYTOIN SODIUM 100 MG: 100 CAPSULE, EXTENDED RELEASE ORAL at 11:47

## 2025-02-08 RX ADMIN — HYDRALAZINE HYDROCHLORIDE 10 MG: 20 INJECTION INTRAMUSCULAR; INTRAVENOUS at 01:48

## 2025-02-08 RX ADMIN — NICARDIPINE HYDROCHLORIDE 15 MG/HR: 0.1 INJECTION, SOLUTION INTRAVENOUS at 13:31

## 2025-02-08 RX ADMIN — SODIUM BICARBONATE 1300 MG: 650 TABLET ORAL at 08:44

## 2025-02-08 RX ADMIN — NICARDIPINE HYDROCHLORIDE 7.5 MG/HR: 0.1 INJECTION, SOLUTION INTRAVENOUS at 20:58

## 2025-02-08 RX ADMIN — NICARDIPINE HYDROCHLORIDE 7.5 MG/HR: 0.1 INJECTION, SOLUTION INTRAVENOUS at 16:49

## 2025-02-08 RX ADMIN — AMLODIPINE BESYLATE 5 MG: 5 TABLET ORAL at 08:43

## 2025-02-08 ASSESSMENT — PAIN SCALES - GENERAL
PAINLEVEL_OUTOF10: 0

## 2025-02-08 NOTE — FLOWSHEET NOTE
02/07/25 2320   Observations & Evaluations   Level of Consciousness 0   Respiratory Quality/Effort Unlabored   O2 Device None (Room air)   Skin Color   (Normal for ethnicity)   Skin Condition/Temp Warm;Dry   Abdomen Inspection Soft;Obese   Edema Right lower extremity;Left lower extremity   RLE Edema +1;Non-pitting   LLE Edema +1;Non-pitting   Vital Signs   BP (!) 202/118   Temp 98.7 °F (37.1 °C)   Pulse 85   Respirations 16   SpO2 98 %   Pain Assessment   Pain Assessment None - Denies Pain   Technical Checks   Dialysis Machine No. SMH [id number]   RO Machine Number [id number]   Dialyzer Lot No. [id number]   Tubing Lot Number [id number]   All Connections Secure Yes   NS Bag Yes   Saline Line Double Clamped Yes   Dialyzer Nipro ELISIO   Prime Volume (mL) 200 mL   ICEBOAT I;C;E;B;O;A;T   RO Machine Log Sheet Completed Yes   Machine Alarm Self Test Completed;Passed   Air Foam Detector Tested;Proper Function   Extracorporeal Circuit Tested for Integrity Yes   Machine Conductivity 13.6   Manual Ph 7.4   Bleach Test (Neg) Yes   Bath Temperature 98.6 °F (37 °C)   Hemodialysis Central Access - Temporary Right Neck   Placement Date/Time: 02/07/25 1636   Present on Admission/Arrival: No  Inserted by: Minna guzman  Insertion Practices: Chlorohexadine skin antisepsis;Hand hygiene;Maximal barrier precautions;Optimal catheter site selection;Sterile ultrasound technique...   Continued need for line? Yes   Site Assessment Other (Comment)  (Small amount of old sanguinous drng at the inserion site.)   Blue Lumen Status Brisk blood return;Flushed   Red Lumen Status Brisk blood return;Flushed   Line Care Ports disinfected;Chlorhexidine wipes;Connections checked and tightened   Dressing Type Transparent;Bacteriocidal   Date of Last Dressing Change 02/07/25   Dressing Status Old drainage noted   Dialysis Bath   K+ (Potassium) 2   Ca+ (Calcium) 3   Na+ (Sodium) 138   HCO3 (Bicarb) 35   Acid Concentrate Lot No. [id number]        02/07/25 2325   Treatment

## 2025-02-08 NOTE — PROGRESS NOTES
[address], [facility] [place] [address]  Phone: [phone]   [phone]    [email or web address]     Nephrology Progress Note    Patient Name : Ward Bhatia      : 1980     MRN : [id number]  Date of Admission : 2025  Date of Servive : 25    CC:  Follow up for ESRD       Assessment and Plan   New Onset ESRD : HD c/b seizure   Chronic crescentic Ig A nephropathy   Hyperkalemia   Metabolic acidosis   Seizure Disorder : missed dilantin for 4 days PTA  Anemia in CKD  Obesity   Anxiety   Uncontrolled HTN  Lactic acidosis : likely post ictal     Discussion :  - Dilantin is highly dialyzable and further he missed 4 days of medication. Severe uremia and 1st dialysis Rx all contributed to lowering seizure Threshold.     Plan :  - Neurology to decide continuing dilantin vs switching Keppra which is a better drug in ESRD pts   - If switched to Keppra --> needs loading dose prior to attempting to HD  - he needs HD sooner than later   - will plan for HD QOD   - Mannitol 12.5 gm with todays HD Rx   - start oral Bicarb   - No JEROME until seizures controlled  - continue cardene gtt for now       Interval History:  Seen and examined. Pt had seizure 1 hr and 15 min into HD Rx./ Mother at bedside reported that pt ran out of his dilantin for 4 days.   Denies any N/V/CPS/OB    Review of Systems: Pertinent items are noted in HPI.    Current Medications:   Current Facility-Administered Medications   Medication Dose Route Frequency    niCARdipine (CARDENE) 20 mg in 0.86 % sodium chloride 200 mL infusion  2.5-15 mg/hr IntraVENous Continuous    sodium bicarbonate tablet 1,300 mg  1,300 mg Oral BID    sodium chloride flush 0.9 % injection 5-40 mL  5-40 mL IntraVENous 2 times per day    sodium chloride flush 0.9 % injection 5-40 mL  5-40 mL IntraVENous PRN    0.9 % sodium chloride infusion   IntraVENous PRN    ondansetron (ZOFRAN-ODT) disintegrating tablet 4 mg  4

## 2025-02-08 NOTE — PLAN OF CARE
Problem: Discharge Planning  Goal: Discharge to home or other facility with appropriate resources  2/8/2025 0620 by Teressa Ferreira RN  Outcome: Progressing  Flowsheets (Taken 2/7/2025 2000)  Discharge to home or other facility with appropriate resources: Identify barriers to discharge with patient and caregiver  2/7/2025 1957 by Yumi Esposito RN  Outcome: Progressing     Problem: Pain  Goal: Verbalizes/displays adequate comfort level or baseline comfort level  2/8/2025 0620 by Teressa Ferreira RN  Outcome: Progressing  2/7/2025 1957 by Yumi Esposito RN  Outcome: Progressing     Problem: Safety - Adult  Goal: Free from fall injury  2/8/2025 0620 by Teressa Ferreira RN  Outcome: Progressing  Flowsheets (Taken 2/7/2025 2000)  Free From Fall Injury: Instruct family/caregiver on patient safety  2/7/2025 1957 by Yumi Esposito RN  Outcome: Progressing

## 2025-02-08 NOTE — FLOWSHEET NOTE
02/08/25 1500   Vital Signs   BP (!) 179/95   Temp 99.1 °F (37.3 °C)   Pulse (!) 113   Respirations 14   SpO2 100 %   Pain Assessment   Pain Assessment None - Denies Pain   Pain Level 0   Treatment   Treatment Number 2   Treatment Goal 2hr, 1L   Observations & Evaluations   Level of Consciousness 0   Oriented X nida   Heart Rhythm Regular   Respiratory Quality/Effort Unlabored   O2 Device None (Room air)   Bilateral Breath Sounds Clear   Skin Color Other (comment)  (WDL)   Skin Condition/Temp Warm;Dry   Abdomen Inspection Obese   Edema Right lower extremity;Left lower extremity   RLE Edema +1   LLE Edema +1   Technical Checks   Dialysis Machine No. 01   RO Machine Number [id number]   Dialyzer Lot No. [id number]   Tubing Lot Number [id number]   All Connections Secure Yes   NS Bag Yes   Saline Line Double Clamped Yes   Dialyzer Nipro ELISIO   Prime Volume (mL) 250 mL   ICEBOAT I;C;E;B;O;A;T   RO Machine Log Sheet Completed Yes   Machine Alarm Self Test Completed;Passed   Air Foam Detector Tested;Proper Function   Extracorporeal Circuit Tested for Integrity Yes   Machine Conductivity 13.6   Manual Ph 7.4   Bleach Test (Neg) Yes   Bath Temperature 96.8 °F (36 °C)   Dialysis Bath   K+ (Potassium) 3   Ca+ (Calcium) 2.5   Na+ (Sodium) 138   HCO3 (Bicarb) 40   Bicarbonate Concentrate Lot No. [id number]   Acid Concentrate Lot No. [id number]   Treatment Initiation   Dialyze Hours 2   Treatment  Initiation Universal Precautions maintained;Lines secured to patient;Connections secured;Prime given;Venous Parameters set;Arterial Parameters set;Air foam detector engaged;Dialysate;Saline line double clamped;Dialyzer   Hemodialysis Central Access - Temporary Right Neck   Placement Date/Time: 02/07/25 1636   Present on Admission/Arrival: No  Inserted by: Minna guzman  Insertion Practices: Chlorohexadine skin antisepsis;Hand hygiene;Maximal barrier precautions;Optimal catheter site selection;Sterile ultrasound technique...   Continued need for

## 2025-02-08 NOTE — CARE COORDINATION
Care Management Initial Assessment       RUR: 14%  Readmission? No  1st IM letter given? Yes - 2/8/25  1st  letter given: No      Patient admitted for poor appetite +fatigue. X 1 week      Residence: 1 level home 3 steps to enter  ADL: dependent  DME: none  Pharmacy: [facility] [address]  PCP: unsure of name at time of call  Verified Insurance: Medicare A and B, [facility] Medicaid  Emergency Contacts mother Rosemary Brown [id number]  Previous rehab/services: no  Transportation: family      Nephrology following        02/08/25 1449   Service Assessment   Patient Orientation Alert and Oriented   Cognition Alert   History Provided By Child/Family   Primary Caregiver Family   Support Systems Parent   Patient's Healthcare Decision Maker is: Legal Next of Kin   PCP Verified by CM No   Prior Functional Level Assistance with the following:;Bathing;Dressing;Cooking;Housework;Shopping   Current Functional Level Assistance with the following:;Bathing;Dressing;Cooking;Housework;Shopping   Can patient return to prior living arrangement Yes   Ability to make needs known: Good   Family able to assist with home care needs: Yes   Would you like for me to discuss the discharge plan with any other family members/significant others, and if so, who? Yes  (Mother Rosemary Brown [phone])   Social/Functional History   Lives With Family   Type of Home House   Home Layout One level     Veto Munoz RN, BSN, BSHCM  Case Management [facility]

## 2025-02-08 NOTE — PROGRESS NOTES
0730 - receive report from Teressa FRAZIER    0800 - spoke w Dr. Horne about neuro consult for keppra dosing. Neuro consult placed.    1000 - pt vomited during breakfast, zofran given    1130 - cardizem @15    1200 - discussed neuro consult w Dr. Zhao, orders modified to assign a neurologist. Pt sbp still >200 maxxed on cardene. PRN labetolol given, attending updated     1300 - per neuro no need for consult, hospitalist and pharmacy to dose renally, consult dc'ed    1425 - HD @ BS, keppra given    1640 - mannitol given by dialysis RN and pharmacy    1650 - sbp with significant drop after mannitol admin, cardene to 7.5    1750 - HD completed. Pt vomited medium amount of bilious emesis. Zofran given. /60. Pt diaphoretic after emesis episode, states NAD. MD updated    1850 - post dialysis keppra dose given    1930 - report given to Teressa FRAZIER

## 2025-02-08 NOTE — SIGNIFICANT EVENT
Rapid Response  Rapid response room 447 called overhead at 0047. RRT responding.    Rapid response called for  possible seizure    Christine Hightower NP, RT, nursing emani Astudillo, dialysis RN, and primary RN at bedside.    Dialysis RN reported seizure like activity. Pt about 1.5 hours into dialysis. Hypertensive. HR 80's. Pt started vomiting during rapid. Labs drawn and blood work walked to lab. Pt hadn't taken seizure meds prior to dialysis and can't take now due to vomiting.     Checked in with primary RN prior to leaving. Opportunity for questions and concerns provided.      Rapid Response Team Sepsis Screening  Is the patient's history suggestive of a new infection? No    Are two or more SIRS criteria present? No    Is there evidence of Organ Dysfunction? [facility] Sepsis OD: None    Communication with provider: No    Was a Code Sepsis called at this encounter? No. Why? Not indicated

## 2025-02-08 NOTE — PROGRESS NOTES
4 Eyes Skin Assessment     NAME:  Ward Bhatia  YOB: 1980  MEDICAL RECORD NUMBER:  [id number]    The patient is being assessed for  Admission    I agree that at least one RN has performed a thorough Head to Toe Skin Assessment on the patient. ALL assessment sites listed below have been assessed.      Areas assessed by both nurses:    Head, Face, Ears, Shoulders, Back, Chest, Arms, Elbows, Hands, Sacrum. Buttock, Coccyx, Ischium, Legs. Feet and Heels, and Under Medical Devices         Does the Patient have a Wound? No noted wound(s)       John Prevention initiated by RN: No  Wound Care Orders initiated by RN: No    Pressure Injury (Stage 3,4, Unstageable, DTI, NWPT, and Complex wounds) if present, place Wound referral order by RN under : No    New Ostomies, if present place, Ostomy referral order under : No     Nurse 1 eSignature: Electronically signed by Yumi Esposito RN on 2/7/25 at 7:52 PM EST    **SHARE this note so that the co-signing nurse can place an eSignature**    Nurse 2 eSignature: Electronically signed by Joanie Borrego RN on 2/7/25 at 7:52 PM EST

## 2025-02-08 NOTE — PROGRESS NOTES
Hospitalist Night Cover     Name: Ward Bhatia  YOB: 1980      Overnight update:        Ward Bhatia is a 45yo with a past medical history of anxiety, autism, CKD, iGA nephropathy, and epilepsy who was admitted for PEMA on CKD, to start HD     Rapid response called for seizure like activity.   Seen and examined patient at bedside. He is awake and alert. States that he is feeling \"fine\" and denies any pain. Does not appear post-ictal. Follows commands.   Per Dialysis RN- This is his first diaylsis treatment and he was 1.5 into the session. He started having \"generalized seizure\" Dialysis session stopped.   Per mother who is at bedside- Patient had a seizure lasting less than one minute and spontaneously broke. States that when he came to the hospital he missed at least two doses of dilantin. Last seizure was about 6 years ago.     Seizure   - hx of epilepsy   - PO dilantin changed to IV due to n/v.   - Labs pending   - Seizure precautions   - Consider neurology consult in AM    Hypertension   - Uncontrolled.   - Discussed with nephrology Dr. Redd- Will give one dose bumex and hydralazine now.   - If BP remains elevated will start cardene gtts.     Patient at high risk for decompensation     Addendum   0330: BP remains elevated. Start cardene gtts   Discussed with bedside RN     Critical Care Attestation:  This patient is unstable and critically ill. Due to a high probability of clinically significant, life threatening deterioration, the patient required my highest level of preparedness to intervene emergently and I personally spent this critical care time directly and personally managing the patient. This critical care time included obtaining a history; examining the patient; pulse oximetry; ordering and review of studies; arranging urgent treatment with development of a management plan; evaluation of patient's response to treatment; frequent

## 2025-02-08 NOTE — PROGRESS NOTES
0045 - Notified by dialysis nurse that pt. was having seizure like activity. Dialysis stopped. RRT called. Seizure lasted less than one minute per mother and dialysis nurse. Pt. 1.5 hours into dialysis and hypertensive. Pt. Vomited during RRT.     0055- Pt. Following commands, denies pain and states that he is feeling \"fine\".    Per mother, patient missed two doses of dilantin and his last seizure was about  6 years ago.     0148 - IV dilantin, hydralazine, and bumex given.     0342 - Cardene drip started.

## 2025-02-08 NOTE — PROGRESS NOTES
[facility]                                                                               Hospitalist Progress Note  Anny Zhao MD  Answering service: [phone] OR [phone] from in house phone        Date of Service:  2025  NAME:  Ward Bhatia  :  1980  MRN:  [id number]       Admission Summary:   Ward Bhatia is a 44 y.o. male with a pmhx IfA nephropathy who presents with poor appetite, and fatigue for the past week and is being admitted for PEMA on CKD.     In the ED, BP was elevated to 199/101.  Labs showed normocytic anemia with Hgb 9.3 (b/l 11.4), CO2 9, AG 21, creatinine 16.5, and Ca2+ 6.4.       In the ED, nephrology was consulted, and plan for initiation of HD      Interval history / Subjective:   Had seizure overnight  Wife at bedside says it is because he hasn't been taking his AED at home d/t medical issues with kidneys, he otherwise has not had a seizure in at least 10 years  Cardene gtt was also started overnight for uncontrolled HTN  No acute complaints  Hypertensive to 209/121  NAD    Addendum: I d/w neurology who said consult not necessarily needed for dosing as they defer to pharmacy to adjust the AED doses for ESRD/HD. I d/w RN and pharmacy. Will give one dose keppra now prior to HD.      Assessment & Plan:       #IGA nephropathy  #new onset ESRD  #anemia of CKD  -admitted for tunneled cath to start HD  -appreciate nephrology     #htn uncontrolled  -prn hydralazine  -continue home norvasc  -monitor post HD  -wean cardene gtt as able     #seizure disorder  -continue home phenytoin  -seizure overnight 2/2 Dilantin highly dialyzable and he missed 4 days of medication PTA; Severe uremia and 1st dialysis Rx all contributed to lowering seizure Threshold per nephrology  -adjust AED dose for ESRD/HD, d/w pharmacy      Code status: Full Code No additional code details  Prophylaxis: Heparin ppx  Care Plan discussed with: Patient/Family, Nurse, and Other  condition today.      Medications Reviewed:     Current Facility-Administered Medications   Medication Dose Route Frequency    niCARdipine (CARDENE) 20 mg in 0.86 % sodium chloride 200 mL infusion  2.5-15 mg/hr IntraVENous Continuous    sodium bicarbonate tablet 1,300 mg  1,300 mg Oral BID    mannitol 25 % injection 12.5 g  12.5 g IntraVENous Once in dialysis    sodium chloride flush 0.9 % injection 5-40 mL  5-40 mL IntraVENous 2 times per day    sodium chloride flush 0.9 % injection 5-40 mL  5-40 mL IntraVENous PRN    0.9 % sodium chloride infusion   IntraVENous PRN    ondansetron (ZOFRAN-ODT) disintegrating tablet 4 mg  4 mg Oral Q8H PRN    Or    ondansetron (ZOFRAN) injection 4 mg  4 mg IntraVENous Q6H PRN    polyethylene glycol (GLYCOLAX) packet 17 g  17 g Oral Daily PRN    acetaminophen (TYLENOL) tablet 650 mg  650 mg Oral Q6H PRN    Or    acetaminophen (TYLENOL) suppository 650 mg  650 mg Rectal Q6H PRN    phenytoin (DILANTIN) ER capsule 100 mg  100 mg Oral 5x Daily    amLODIPine (NORVASC) tablet 5 mg  5 mg Oral Daily    hydrALAZINE (APRESOLINE) injection 5 mg  5 mg IntraVENous Q6H PRN     ______________________________________________________________________  EXPECTED LENGTH OF STAY: 3  ACTUAL LENGTH OF STAY:          1                 Anny Zhao MD

## 2025-02-09 LAB
ALBUMIN SERPL-MCNC: 2.9 G/DL (ref 3.5–5)
ANION GAP SERPL CALC-SCNC: 15 MMOL/L (ref 2–12)
BUN SERPL-MCNC: 85 MG/DL (ref 6–20)
BUN/CREAT SERPL: 7 (ref 12–20)
CALCIUM SERPL-MCNC: 7 MG/DL (ref 8.5–10.1)
CHLORIDE SERPL-SCNC: 105 MMOL/L (ref 97–108)
CO2 SERPL-SCNC: 20 MMOL/L (ref 21–32)
CREAT SERPL-MCNC: 11.4 MG/DL (ref 0.7–1.3)
EKG ATRIAL RATE: 118 BPM
EKG DIAGNOSIS: NORMAL
EKG P AXIS: 46 DEGREES
EKG P-R INTERVAL: 140 MS
EKG Q-T INTERVAL: 352 MS
EKG QRS DURATION: 74 MS
EKG QTC CALCULATION (BAZETT): 493 MS
EKG R AXIS: 0 DEGREES
EKG T AXIS: 36 DEGREES
EKG VENTRICULAR RATE: 118 BPM
ERYTHROCYTE [DISTWIDTH] IN BLOOD BY AUTOMATED COUNT: 12.9 % (ref 11.5–14.5)
GLUCOSE SERPL-MCNC: 95 MG/DL (ref 65–100)
HCT VFR BLD AUTO: 24.1 % (ref 36.6–50.3)
HGB BLD-MCNC: 8.5 G/DL (ref 12.1–17)
MAGNESIUM SERPL-MCNC: 1.8 MG/DL (ref 1.6–2.4)
MCH RBC QN AUTO: 27.6 PG (ref 26–34)
MCHC RBC AUTO-ENTMCNC: 35.3 G/DL (ref 30–36.5)
MCV RBC AUTO: 78.2 FL (ref 80–99)
NRBC # BLD: 0 K/UL (ref 0–0.01)
NRBC BLD-RTO: 0 PER 100 WBC
PHOSPHATE SERPL-MCNC: 7.9 MG/DL (ref 2.6–4.7)
PLATELET # BLD AUTO: 210 K/UL (ref 150–400)
PMV BLD AUTO: 9.8 FL (ref 8.9–12.9)
POTASSIUM SERPL-SCNC: 3.6 MMOL/L (ref 3.5–5.1)
RBC # BLD AUTO: 3.08 M/UL (ref 4.1–5.7)
SODIUM SERPL-SCNC: 140 MMOL/L (ref 136–145)
WBC # BLD AUTO: 5.4 K/UL (ref 4.1–11.1)

## 2025-02-09 PROCEDURE — 2500000003 HC RX 250 WO HCPCS: Performed by: FAMILY MEDICINE

## 2025-02-09 PROCEDURE — 6370000000 HC RX 637 (ALT 250 FOR IP): Performed by: INTERNAL MEDICINE

## 2025-02-09 PROCEDURE — 6370000000 HC RX 637 (ALT 250 FOR IP): Performed by: FAMILY MEDICINE

## 2025-02-09 PROCEDURE — 6360000002 HC RX W HCPCS: Performed by: FAMILY MEDICINE

## 2025-02-09 PROCEDURE — 6360000002 HC RX W HCPCS: Performed by: INTERNAL MEDICINE

## 2025-02-09 PROCEDURE — 36415 COLL VENOUS BLD VENIPUNCTURE: CPT

## 2025-02-09 PROCEDURE — 2060000000 HC ICU INTERMEDIATE R&B

## 2025-02-09 PROCEDURE — 80069 RENAL FUNCTION PANEL: CPT

## 2025-02-09 PROCEDURE — 6360000002 HC RX W HCPCS: Performed by: NURSE PRACTITIONER

## 2025-02-09 PROCEDURE — 85027 COMPLETE CBC AUTOMATED: CPT

## 2025-02-09 PROCEDURE — 83735 ASSAY OF MAGNESIUM: CPT

## 2025-02-09 RX ORDER — LEVETIRACETAM 500 MG/5ML
500 INJECTION, SOLUTION, CONCENTRATE INTRAVENOUS
Status: DISCONTINUED | OUTPATIENT
Start: 2025-02-10 | End: 2025-02-15 | Stop reason: HOSPADM

## 2025-02-09 RX ORDER — PANTOPRAZOLE SODIUM 40 MG/1
40 TABLET, DELAYED RELEASE ORAL
Status: DISCONTINUED | OUTPATIENT
Start: 2025-02-10 | End: 2025-02-12

## 2025-02-09 RX ORDER — CALCIUM CARBONATE 500 MG/1
500 TABLET, CHEWABLE ORAL 2 TIMES DAILY PRN
Status: DISCONTINUED | OUTPATIENT
Start: 2025-02-09 | End: 2025-02-15 | Stop reason: HOSPADM

## 2025-02-09 RX ORDER — LOSARTAN POTASSIUM 50 MG/1
50 TABLET ORAL DAILY
Status: DISCONTINUED | OUTPATIENT
Start: 2025-02-09 | End: 2025-02-10

## 2025-02-09 RX ADMIN — NICARDIPINE HYDROCHLORIDE 7.5 MG/HR: 0.1 INJECTION, SOLUTION INTRAVENOUS at 08:45

## 2025-02-09 RX ADMIN — ONDANSETRON 4 MG: 2 INJECTION INTRAMUSCULAR; INTRAVENOUS at 17:23

## 2025-02-09 RX ADMIN — NICARDIPINE HYDROCHLORIDE 7.5 MG/HR: 0.1 INJECTION, SOLUTION INTRAVENOUS at 17:22

## 2025-02-09 RX ADMIN — NICARDIPINE HYDROCHLORIDE 7.5 MG/HR: 0.1 INJECTION, SOLUTION INTRAVENOUS at 14:37

## 2025-02-09 RX ADMIN — NICARDIPINE HYDROCHLORIDE 10 MG/HR: 0.1 INJECTION, SOLUTION INTRAVENOUS at 22:24

## 2025-02-09 RX ADMIN — NICARDIPINE HYDROCHLORIDE 6.5 MG/HR: 0.1 INJECTION, SOLUTION INTRAVENOUS at 01:50

## 2025-02-09 RX ADMIN — LEVETIRACETAM 500 MG: 100 INJECTION INTRAVENOUS at 08:38

## 2025-02-09 RX ADMIN — SODIUM CHLORIDE, PRESERVATIVE FREE 10 ML: 5 INJECTION INTRAVENOUS at 08:40

## 2025-02-09 RX ADMIN — HEPARIN SODIUM 5000 UNITS: 5000 INJECTION INTRAVENOUS; SUBCUTANEOUS at 06:04

## 2025-02-09 RX ADMIN — HEPARIN SODIUM 5000 UNITS: 5000 INJECTION INTRAVENOUS; SUBCUTANEOUS at 21:52

## 2025-02-09 RX ADMIN — NICARDIPINE HYDROCHLORIDE 5.5 MG/HR: 0.1 INJECTION, SOLUTION INTRAVENOUS at 05:08

## 2025-02-09 RX ADMIN — SODIUM CHLORIDE, PRESERVATIVE FREE 10 ML: 5 INJECTION INTRAVENOUS at 21:26

## 2025-02-09 RX ADMIN — HEPARIN SODIUM 5000 UNITS: 5000 INJECTION INTRAVENOUS; SUBCUTANEOUS at 13:21

## 2025-02-09 RX ADMIN — NICARDIPINE HYDROCHLORIDE 10 MG/HR: 0.1 INJECTION, SOLUTION INTRAVENOUS at 19:56

## 2025-02-09 RX ADMIN — LOSARTAN POTASSIUM 50 MG: 50 TABLET, FILM COATED ORAL at 08:40

## 2025-02-09 RX ADMIN — CALCIUM CARBONATE (ANTACID) CHEW TAB 500 MG 500 MG: 500 CHEW TAB at 17:23

## 2025-02-09 RX ADMIN — AMLODIPINE BESYLATE 5 MG: 5 TABLET ORAL at 08:40

## 2025-02-09 RX ADMIN — NICARDIPINE HYDROCHLORIDE 7.5 MG/HR: 0.1 INJECTION, SOLUTION INTRAVENOUS at 11:35

## 2025-02-09 ASSESSMENT — PAIN SCALES - GENERAL
PAINLEVEL_OUTOF10: 0
PAINLEVEL_OUTOF10: 0

## 2025-02-09 NOTE — PROGRESS NOTES
[address], [facility] [place] [address]  Phone: [phone]   [phone]    [email or web address]     Nephrology Progress Note    Patient Name : Ward Bhatia      : 1980     MRN : [id number]  Date of Admission : 2025  Date of Servive : 25    CC:  Follow up for ESRD       Assessment and Plan   New Onset ESRD :   - 2/2 Chronic crescentic Ig A nephropathy   - 1st HD c/b break through seizure   - has DEVON Page   - tolerated HD # 2 yesterday  - HD MWF starting tomorrow   - mother interested in home modalities --> Dr Smith to decide     Hyperkalemia   Metabolic acidosis  - expect improvement w/ HD     Seizure Disorder :   - missed dilantin for 4 days PTA  - break through seizure   - Now on Keppra - dose to dialysis     Anemia in CKD  - start JEROME  - iron ok     HTN  - added Losartan   - continue amlodipine     Obesity   Anxiety          Interval History:  Seen and examined. Tolerated HD w/ Mannitol and got Keppra pre and post HD  Vomited at the end of Rx   BP high. Overall MS improving     Review of Systems: Pertinent items are noted in HPI.    Current Medications:   Current Facility-Administered Medications   Medication Dose Route Frequency    losartan (COZAAR) tablet 50 mg  50 mg Oral Daily    [START ON 2/10/2025] Epoetin Milton-epbx (RETACRIT) injection 20,000 Units  20,000 Units SubCUTAneous Once per day on     niCARdipine (CARDENE) 20 mg in 0.86 % sodium chloride 200 mL infusion  2.5-15 mg/hr IntraVENous Continuous    hydrALAZINE (APRESOLINE) injection 20 mg  20 mg IntraVENous Q4H PRN    heparin (porcine) injection 5,000 Units  5,000 Units SubCUTAneous 3 times per day    labetalol (NORMODYNE;TRANDATE) injection 15 mg  15 mg IntraVENous Q4H PRN    heparin (porcine) 1000 UNIT/ML injection 1,300 Units  1,300 Units IntraCATHeter PRN    And    heparin (porcine) 1000 UNIT/ML injection 1,300 Units  1,300 Units    CREATININE 16.50* 11.50* 11.40*   CALCIUM 6.4* 9.1 7.0*       Recent Labs     02/07/25  0933 02/08/25  0058 02/09/25  0524   WBC 5.2 9.6 5.4   RBC 3.41* 3.71* 3.08*   HGB 9.3* 10.0* 8.5*   HCT 28.0* 28.7* 24.1*   MCV 82.1 77.4* 78.2*   MCH 27.3 27.0 27.6   MCHC 33.2 34.8 35.3   RDW 13.4 13.0 12.9    245 210   MPV 10.1 9.5 9.8     Recent Labs     02/07/25  0933   GLOB 4.0     No results for input(s): \"INR\", \"APTT\" in the last 72 hours.    Invalid input(s): \"PTP\"   No results for input(s): \"CPK\", \"CKMB\", \"TROPONINI\" in the last 72 hours.    Invalid input(s): \"B-NP\"  Invalid input(s): \"PHI\", \"PCO2I\", \"PO2I\", \"FIO2I\"     Ventilator:       Microbiology:  No results found for: \"SDES\"  No components found for: \"CULT\"      I have reviewed the flowsheets.  Chart and Pertinent Notes have been reviewed.   No change in PMH ,family and social history from Consult note.      Carson Horne MD  [facility]

## 2025-02-09 NOTE — PLAN OF CARE
Problem: Discharge Planning  Goal: Discharge to home or other facility with appropriate resources  2/9/2025 1349 by Yumi Esposito RN  Outcome: Progressing  Flowsheets (Taken 2/9/2025 0800)  Discharge to home or other facility with appropriate resources: Identify barriers to discharge with patient and caregiver  2/9/2025 0627 by Teressa Ferreira RN  Outcome: Progressing  Flowsheets (Taken 2/8/2025 2000)  Discharge to home or other facility with appropriate resources: Identify barriers to discharge with patient and caregiver     Problem: Pain  Goal: Verbalizes/displays adequate comfort level or baseline comfort level  2/9/2025 1349 by Yumi Esposito RN  Outcome: Progressing  2/9/2025 0627 by Teressa Ferreira RN  Outcome: Progressing     Problem: Safety - Adult  Goal: Free from fall injury  2/9/2025 1349 by Yumi Esposito RN  Outcome: Progressing  2/9/2025 0627 by Teressa Ferreira RN  Outcome: Progressing  Flowsheets (Taken 2/8/2025 2000)  Free From Fall Injury: Instruct family/caregiver on patient safety

## 2025-02-09 NOTE — PROGRESS NOTES
[facility]                                                                               Hospitalist Progress Note  Anny Zhao MD  Answering service: [phone] or [phone] from in house phone        Date of Service:  2025  NAME:  Ward Bhatia  :  1980  MRN:  [id number]       Admission Summary:   Ward Bhatia is a 44 y.o. male with a pmhx IfA nephropathy who presents with poor appetite, and fatigue for the past week and is being admitted for PEMA on CKD.     In the ED, BP was elevated to 199/101.  Labs showed normocytic anemia with Hgb 9.3 (b/l 11.4), CO2 9, AG 21, creatinine 16.5, and Ca2+ 6.4.       In the ED, nephrology was consulted, and plan for initiation of HD      Interval history / Subjective:   Vomited after HD yesterday and this AM after PO meds  NAD  Cardene gtt ongoing     Assessment & Plan:       #IGA nephropathy  #new onset ESRD  #anemia of CKD  -admitted for tunneled cath to start HD  -appreciate nephrology     #htn uncontrolled  -prn hydralazine, labetalol  -continue home norvasc, nephro added ARB  -wean cardene gtt as able     #seizure disorder  -replaced home phenytoin with keppra as preferred for ESRD    #N/V  -r/t new ESRD/HD?  -monitor      Code status: Full Code No additional code details  Prophylaxis: Heparin ppx  Care Plan discussed with: Patient/Family and Nurse  Anticipated Disposition: Home  Anticipated Discharge: >48h  Admission Status: Inpatient  Cardiac monitoring: Telemetry  Central Line:          Social Determinants of Health     Tobacco Use: Low Risk  (2025)    Patient History     Smoking Tobacco Use: Never     Smokeless Tobacco Use: Never     Passive Exposure: Not on file   Alcohol Use: Not on file   Financial Resource Strain: Not on file   Food Insecurity: No Food Insecurity (2025)    Hunger Vital Sign     Worried About Running Out of Food in the Last Year: Never true     Ran Out of Food in the Last Year: Never true

## 2025-02-09 NOTE — PLAN OF CARE
Problem: Discharge Planning  Goal: Discharge to home or other facility with appropriate resources  Outcome: Progressing  Flowsheets (Taken 2/8/2025 2000)  Discharge to home or other facility with appropriate resources: Identify barriers to discharge with patient and caregiver     Problem: Pain  Goal: Verbalizes/displays adequate comfort level or baseline comfort level  Outcome: Progressing     Problem: Safety - Adult  Goal: Free from fall injury  Outcome: Progressing  Flowsheets (Taken 2/8/2025 2000)  Free From Fall Injury: Instruct family/caregiver on patient safety

## 2025-02-10 LAB
ALBUMIN SERPL-MCNC: 3 G/DL (ref 3.5–5)
ANION GAP SERPL CALC-SCNC: 17 MMOL/L (ref 2–12)
BUN SERPL-MCNC: 98 MG/DL (ref 6–20)
BUN/CREAT SERPL: 7 (ref 12–20)
CALCIUM SERPL-MCNC: 7.1 MG/DL (ref 8.5–10.1)
CHLORIDE SERPL-SCNC: 110 MMOL/L (ref 97–108)
CO2 SERPL-SCNC: 17 MMOL/L (ref 21–32)
CREAT SERPL-MCNC: 13.4 MG/DL (ref 0.7–1.3)
ERYTHROCYTE [DISTWIDTH] IN BLOOD BY AUTOMATED COUNT: 12.8 % (ref 11.5–14.5)
GLUCOSE SERPL-MCNC: 70 MG/DL (ref 65–100)
HCT VFR BLD AUTO: 24 % (ref 36.6–50.3)
HGB BLD-MCNC: 8.2 G/DL (ref 12.1–17)
MCH RBC QN AUTO: 27.6 PG (ref 26–34)
MCHC RBC AUTO-ENTMCNC: 34.2 G/DL (ref 30–36.5)
MCV RBC AUTO: 80.8 FL (ref 80–99)
NRBC # BLD: 0 K/UL (ref 0–0.01)
NRBC BLD-RTO: 0 PER 100 WBC
PHOSPHATE SERPL-MCNC: 8.2 MG/DL (ref 2.6–4.7)
PLATELET # BLD AUTO: 214 K/UL (ref 150–400)
PMV BLD AUTO: 10 FL (ref 8.9–12.9)
POTASSIUM SERPL-SCNC: 3.9 MMOL/L (ref 3.5–5.1)
RBC # BLD AUTO: 2.97 M/UL (ref 4.1–5.7)
SODIUM SERPL-SCNC: 144 MMOL/L (ref 136–145)
WBC # BLD AUTO: 6.3 K/UL (ref 4.1–11.1)

## 2025-02-10 PROCEDURE — 6360000002 HC RX W HCPCS: Performed by: NURSE PRACTITIONER

## 2025-02-10 PROCEDURE — 6360000002 HC RX W HCPCS: Performed by: INTERNAL MEDICINE

## 2025-02-10 PROCEDURE — 80069 RENAL FUNCTION PANEL: CPT

## 2025-02-10 PROCEDURE — 85027 COMPLETE CBC AUTOMATED: CPT

## 2025-02-10 PROCEDURE — 6370000000 HC RX 637 (ALT 250 FOR IP): Performed by: FAMILY MEDICINE

## 2025-02-10 PROCEDURE — 6360000002 HC RX W HCPCS: Performed by: FAMILY MEDICINE

## 2025-02-10 PROCEDURE — 6370000000 HC RX 637 (ALT 250 FOR IP): Performed by: HOSPITALIST

## 2025-02-10 PROCEDURE — 90935 HEMODIALYSIS ONE EVALUATION: CPT

## 2025-02-10 PROCEDURE — 6370000000 HC RX 637 (ALT 250 FOR IP): Performed by: INTERNAL MEDICINE

## 2025-02-10 PROCEDURE — 2500000003 HC RX 250 WO HCPCS: Performed by: FAMILY MEDICINE

## 2025-02-10 PROCEDURE — 92610 EVALUATE SWALLOWING FUNCTION: CPT

## 2025-02-10 PROCEDURE — 2060000000 HC ICU INTERMEDIATE R&B

## 2025-02-10 RX ORDER — AMLODIPINE BESYLATE 5 MG/1
10 TABLET ORAL DAILY
Status: DISCONTINUED | OUTPATIENT
Start: 2025-02-11 | End: 2025-02-15 | Stop reason: HOSPADM

## 2025-02-10 RX ORDER — LOSARTAN POTASSIUM 50 MG/1
50 TABLET ORAL ONCE
Status: DISCONTINUED | OUTPATIENT
Start: 2025-02-10 | End: 2025-02-15 | Stop reason: HOSPADM

## 2025-02-10 RX ORDER — DOXAZOSIN 2 MG/1
1 TABLET ORAL DAILY
Status: DISCONTINUED | OUTPATIENT
Start: 2025-02-10 | End: 2025-02-15 | Stop reason: HOSPADM

## 2025-02-10 RX ORDER — LOSARTAN POTASSIUM 50 MG/1
100 TABLET ORAL DAILY
Status: DISCONTINUED | OUTPATIENT
Start: 2025-02-11 | End: 2025-02-15 | Stop reason: HOSPADM

## 2025-02-10 RX ORDER — ALBUMIN (HUMAN) 12.5 G/50ML
25 SOLUTION INTRAVENOUS PRN
Status: DISCONTINUED | OUTPATIENT
Start: 2025-02-10 | End: 2025-02-15 | Stop reason: HOSPADM

## 2025-02-10 RX ORDER — AMLODIPINE BESYLATE 5 MG/1
5 TABLET ORAL ONCE
Status: DISCONTINUED | OUTPATIENT
Start: 2025-02-10 | End: 2025-02-15

## 2025-02-10 RX ADMIN — NICARDIPINE HYDROCHLORIDE 8 MG/HR: 0.1 INJECTION, SOLUTION INTRAVENOUS at 13:29

## 2025-02-10 RX ADMIN — ONDANSETRON 4 MG: 2 INJECTION INTRAMUSCULAR; INTRAVENOUS at 13:32

## 2025-02-10 RX ADMIN — EPOETIN ALFA-EPBX 20000 UNITS: 20000 INJECTION, SOLUTION INTRAVENOUS; SUBCUTANEOUS at 16:08

## 2025-02-10 RX ADMIN — NICARDIPINE HYDROCHLORIDE 10 MG/HR: 0.1 INJECTION, SOLUTION INTRAVENOUS at 04:20

## 2025-02-10 RX ADMIN — NICARDIPINE HYDROCHLORIDE 12.5 MG/HR: 0.1 INJECTION, SOLUTION INTRAVENOUS at 06:10

## 2025-02-10 RX ADMIN — LABETALOL HYDROCHLORIDE 15 MG: 5 INJECTION INTRAVENOUS at 05:53

## 2025-02-10 RX ADMIN — HEPARIN SODIUM 5000 UNITS: 5000 INJECTION INTRAVENOUS; SUBCUTANEOUS at 06:08

## 2025-02-10 RX ADMIN — NICARDIPINE HYDROCHLORIDE 7 MG/HR: 0.1 INJECTION, SOLUTION INTRAVENOUS at 19:06

## 2025-02-10 RX ADMIN — NICARDIPINE HYDROCHLORIDE 10 MG/HR: 0.1 INJECTION, SOLUTION INTRAVENOUS at 00:20

## 2025-02-10 RX ADMIN — NICARDIPINE HYDROCHLORIDE 8 MG/HR: 0.1 INJECTION, SOLUTION INTRAVENOUS at 16:07

## 2025-02-10 RX ADMIN — HEPARIN SODIUM 1300 UNITS: 1000 INJECTION INTRAVENOUS; SUBCUTANEOUS at 10:58

## 2025-02-10 RX ADMIN — HEPARIN SODIUM 5000 UNITS: 5000 INJECTION INTRAVENOUS; SUBCUTANEOUS at 13:30

## 2025-02-10 RX ADMIN — NICARDIPINE HYDROCHLORIDE 8 MG/HR: 0.1 INJECTION, SOLUTION INTRAVENOUS at 10:51

## 2025-02-10 RX ADMIN — SODIUM CHLORIDE, PRESERVATIVE FREE 10 ML: 5 INJECTION INTRAVENOUS at 08:05

## 2025-02-10 RX ADMIN — CALCIUM CARBONATE (ANTACID) CHEW TAB 500 MG 500 MG: 500 CHEW TAB at 13:31

## 2025-02-10 RX ADMIN — HEPARIN SODIUM 5000 UNITS: 5000 INJECTION INTRAVENOUS; SUBCUTANEOUS at 22:50

## 2025-02-10 RX ADMIN — NICARDIPINE HYDROCHLORIDE 10 MG/HR: 0.1 INJECTION, SOLUTION INTRAVENOUS at 08:05

## 2025-02-10 RX ADMIN — NICARDIPINE HYDROCHLORIDE 7 MG/HR: 0.1 INJECTION, SOLUTION INTRAVENOUS at 22:02

## 2025-02-10 RX ADMIN — LEVETIRACETAM 500 MG: 100 INJECTION INTRAVENOUS at 08:05

## 2025-02-10 RX ADMIN — LEVETIRACETAM 500 MG: 100 INJECTION INTRAVENOUS at 16:08

## 2025-02-10 RX ADMIN — NICARDIPINE HYDROCHLORIDE 10 MG/HR: 0.1 INJECTION, SOLUTION INTRAVENOUS at 02:21

## 2025-02-10 RX ADMIN — PANTOPRAZOLE SODIUM 40 MG: 40 TABLET, DELAYED RELEASE ORAL at 06:08

## 2025-02-10 ASSESSMENT — PAIN SCALES - GENERAL
PAINLEVEL_OUTOF10: 0
PAINLEVEL_OUTOF10: 0

## 2025-02-10 NOTE — PLAN OF CARE
Problem: Discharge Planning  Goal: Discharge to home or other facility with appropriate resources  2/10/2025 1121 by Yumi Esposito RN  Outcome: Progressing  2/9/2025 2300 by Yamila Hong RN  Outcome: Progressing  Flowsheets (Taken 2/9/2025 2300)  Discharge to home or other facility with appropriate resources:   Identify barriers to discharge with patient and caregiver   Identify discharge learning needs (meds, wound care, etc)   Refer to discharge planning if patient needs post-hospital services based on physician order or complex needs related to functional status, cognitive ability or social support system   Arrange for needed discharge resources and transportation as appropriate     Problem: Pain  Goal: Verbalizes/displays adequate comfort level or baseline comfort level  2/10/2025 1121 by Yumi Esposito RN  Outcome: Progressing  2/9/2025 2300 by Yamila Hong RN  Outcome: Progressing  Flowsheets (Taken 2/9/2025 2300)  Verbalizes/displays adequate comfort level or baseline comfort level:   Encourage patient to monitor pain and request assistance   Assess pain using appropriate pain scale   Administer analgesics based on type and severity of pain and evaluate response   Implement non-pharmacological measures as appropriate and evaluate response     Problem: Safety - Adult  Goal: Free from fall injury  2/10/2025 1121 by Yumi Esposito RN  Outcome: Progressing  2/9/2025 2300 by Yamila Hong RN  Outcome: Progressing  Flowsheets (Taken 2/9/2025 2300)  Free From Fall Injury: Instruct family/caregiver on patient safety

## 2025-02-10 NOTE — PROGRESS NOTES
[facility]                                                                               Hospitalist Progress Note  Devonte Bearden MD  Answering service: [phone] OR [phone] from in house phone        Date of Service:  2/10/2025  NAME:  Ward Bhatia  :  1980  MRN:  [id number]       Admission Summary:   Ward Bhatia is a 44 y.o. male with a pmhx IfA nephropathy who presents with poor appetite, and fatigue for the past week and is being admitted for PEMA on CKD.     In the ED, BP was elevated to 199/101.  Labs showed normocytic anemia with Hgb 9.3 (b/l 11.4), CO2 9, AG 21, creatinine 16.5, and Ca2+ 6.4.       In the ED, nephrology was consulted, and plan for initiation of HD      Interval history / Subjective:   Follow up PEMA  Now on dialysis started   NAD  Cardene gtt ongoing   Family at bedside  Assessment & Plan:       #IGA nephropathy  #new onset ESRD  #anemia of CKD  -admitted for tunneled cath to start HD  -appreciate nephrology, CM to look for outpatient dialysis     #htn uncontrolled  -prn hydralazine, labetalol  -continue home norvasc, ARB, increase dose and add cardura  -wean cardene gtt as able     #seizure disorder: replaced home phenytoin with keppra as preferred for ESRD    #N/V  -r/t new ESRD/HD?  -monitor      Code status: Full Code No additional code details  Prophylaxis: Heparin ppx    Plan: Wean off cardene gtt as possible, CM to find outpatient HD chair  Care Plan discussed with: Patient/Family and Nurse  Anticipated Disposition: Home  Anticipated Discharge:1-2 days  Admission Status: Inpatient  Cardiac monitoring: Telemetry  Central Line:          Social Determinants of Health     Tobacco Use: Low Risk  (2025)    Patient History     Smoking Tobacco Use: Never     Smokeless Tobacco Use: Never     Passive Exposure: Not on file   Alcohol Use: Not on file   Financial Resource Strain: Not on file   Food Insecurity: No Food Insecurity (2025)  independent interpretation of the same.    Labs:     Recent Labs     02/09/25  0524 02/10/25  0547   WBC 5.4 6.3   HGB 8.5* 8.2*   HCT 24.1* 24.0*    214     Recent Labs     02/08/25  0058 02/09/25 0524 02/10/25  0547    140 144   K 3.7 3.6 3.9    105 110*   CO2 13* 20* 17*   * 85* 98*   MG  --  1.8  --    PHOS 5.8* 7.9* 8.2*     No results for input(s): \"ALT\", \"TP\", \"GLOB\", \"GGT\" in the last 72 hours.    Invalid input(s): \"SGOT\", \"GPT\", \"AP\", \"TBIL\", \"TBILI\", \"ALB\", \"AML\", \"AMYP\", \"LPSE\", \"HLPSE\"    No results for input(s): \"INR\", \"APTT\" in the last 72 hours.    Invalid input(s): \"PTP\"   Recent Labs     02/08/25  0058   TIBC 146*      No results found for: \"RBCF\"   No results for input(s): \"PH\", \"PCO2\", \"PO2\" in the last 72 hours.  No results for input(s): \"CPK\" in the last 72 hours.    Invalid input(s): \"CPKMB\", \"CKNDX\", \"TROIQ\"  No results found for: \"CHOL\", \"CHLST\", \"CHOLV\", \"HDL\", \"HDLC\", \"LDL\", \"LDLC\"  No results found for: \"GLUCPOC\"  Lab Results   Component Value Date/Time    APPEARANCE CLEAR 02/07/2025 09:30 AM    COLORU YELLOW/STRAW 02/07/2025 09:30 AM    NITRU Negative 02/07/2025 09:30 AM    GLUCOSEU 100 02/07/2025 09:30 AM    KETUA Negative 02/07/2025 09:30 AM    UROBILINOGEN 0.2 02/07/2025 09:30 AM    BILIRUBINUR Negative 02/07/2025 09:30 AM       Notes reviewed from all clinical/nonclinical/nursing services involved in patient's clinical care. Care coordination discussions were held with appropriate clinical/nonclinical/ nursing providers based on care coordination needs.     Patients current active Medications were reviewed, considered, added and adjusted based on the clinical condition today.      Medications Reviewed:     Current Facility-Administered Medications   Medication Dose Route Frequency    albumin human 25% IV solution 25 g  25 g IntraVENous PRN    losartan (COZAAR) tablet 50 mg  50 mg Oral Daily    Epoetin Milton-epbx (RETACRIT) injection 20,000 Units  20,000 Units

## 2025-02-10 NOTE — PROGRESS NOTES
[address], [facility] [place] [address]  Phone: [phone]   [phone]    [email or web address]     Nephrology Progress Note    Patient Name : Ward Bhatia      : 1980     MRN : [id number]  Date of Admission : 2025  Date of Servive : 02/10/25    CC:  Follow up for ESRD       Assessment and Plan   New Onset ESRD :   - 2/2 Chronic crescentic Ig A nephropathy   - HD now and MWF   - CM to work on outpt unit  - family interested in home therapies, will decide today and let me know    Hyperkalemia   Metabolic acidosis     Seizure Disorder :   - missed dilantin for 4 days PTA  - break through seizure   - Now on Keppra     Anemia in CKD  - start JEROME  - iron ok     HTN  - added Losartan   - continue amlodipine     Dysphagia:  - would have speech eval the pt today    Obesity   Anxiety          Interval History:  Seen and examined on HD.  Tolerating ok.  Events over the weekend noted.  No cp, sob, n/v/d.  Per mother, having issues swallowing after the Sz.    Review of Systems: Pertinent items are noted in HPI.    Current Medications:   Current Facility-Administered Medications   Medication Dose Route Frequency    albumin human 25% IV solution 25 g  25 g IntraVENous PRN    losartan (COZAAR) tablet 50 mg  50 mg Oral Daily    Epoetin Milton-epbx (RETACRIT) injection 20,000 Units  20,000 Units SubCUTAneous Once per day on     levETIRAcetam (KEPPRA) injection 500 mg  500 mg IntraVENous Once per day on     pantoprazole (PROTONIX) tablet 40 mg  40 mg Oral QAM AC    calcium carbonate (TUMS) chewable tablet 500 mg  500 mg Oral BID PRN    niCARdipine (CARDENE) 20 mg in 0.86 % sodium chloride 200 mL infusion  2.5-15 mg/hr IntraVENous Continuous    hydrALAZINE (APRESOLINE) injection 20 mg  20 mg IntraVENous Q4H PRN    heparin (porcine) injection 5,000 Units  5,000 Units SubCUTAneous 3 times per day    labetalol

## 2025-02-10 NOTE — PLAN OF CARE
Problem: Discharge Planning  Goal: Discharge to home or other facility with appropriate resources  2/9/2025 2300 by Yamila Hong RN  Outcome: Progressing  Flowsheets (Taken 2/9/2025 2300)  Discharge to home or other facility with appropriate resources:   Identify barriers to discharge with patient and caregiver   Identify discharge learning needs (meds, wound care, etc)   Refer to discharge planning if patient needs post-hospital services based on physician order or complex needs related to functional status, cognitive ability or social support system   Arrange for needed discharge resources and transportation as appropriate     Problem: Pain  Goal: Verbalizes/displays adequate comfort level or baseline comfort level  2/9/2025 2300 by Yamila Hong RN  Outcome: Progressing  Flowsheets (Taken 2/9/2025 2300)  Verbalizes/displays adequate comfort level or baseline comfort level:   Encourage patient to monitor pain and request assistance   Assess pain using appropriate pain scale   Administer analgesics based on type and severity of pain and evaluate response   Implement non-pharmacological measures as appropriate and evaluate response     Problem: Safety - Adult  Goal: Free from fall injury  2/9/2025 2300 by Yamila Hong RN  Outcome: Progressing  Flowsheets (Taken 2/9/2025 2300)  Free From Fall Injury: Instruct family/caregiver on patient safety

## 2025-02-10 NOTE — PLAN OF CARE
Speech LAnguage Pathology EVALUATION    Patient: Ward Bhatia (44 y.o. male)  Date: 2/10/2025  Primary Diagnosis: Hypocalcemia [E83.51]  Acute renal failure, unspecified acute renal failure type (HCC) [N17.9]  Acute kidney injury superimposed on CKD (HCC) [N17.9, N18.9]    Precautions: NA    ASSESSMENT:  Patient participated in limited clinical swallow evaluation consisting of thin liquids and purees. Patient with by immediate regurgitation of both consistencies. Patient continued to have regurgitation of clear secretions for several minutes after each PO trial. RN notified who indicated patient has had this issue throughout day. Higher suspicion for primary esophageal etiology as opposed to pharyngeal dysphagia based on this clinical presentation. Recommend GI involvement to further assess. SLP will continue to follow.     Patient will benefit from skilled intervention to address the above impairments.     PLAN :  Recommendations and Planned Interventions:  GI to further assess for etiology for regurgitation of PO  SLP will continue to follow for interventions as appropriate    Acute SLP Services: SLP Plan of Care: 2 times/week. Patient's rehabilitation potential is considered to be TBD.  Discharge Recommendations: Continue to assess pending progress     SUBJECTIVE:   Patient stated, “It's hard to swallow.”    OBJECTIVE:   No past medical history on file.  Past Surgical History:   Procedure Laterality Date    CT BIOPSY RENAL  4/18/2024    CT BIOPSY RENAL 4/18/2024 [facility] RAD CT    IR INSERT NON TUNNELED CV CATH <5 YEARS  2/7/2025    IR INSERT NON TUNNELED CV CATH <5 YEARS 2/7/2025 [facility] RAD ANGIO IR     Prior Level of Function/Home Situation:   Baseline Diet: Regular texture with thin liquids    Baseline Assessment:  Current Diet: Regular  Current Liquid Diet: Thin  Prior Dysphagia History: Patient states he has had regurgitation of PO since just before admission; He denies prior history of known pharyngeal or  esophageal issues. Patient's father at bedside to provide additional history.    Cognitive and Communication Status:  Neurologic State: Alert  Orientation Level: Oriented to person, DNT other areas of orientation  Cognition: Follows commands    Dysphagia:  Oral Assessment:  Labial: No impairment  Dentition: Natural  Oral Hygiene: Moist;Clean  Lingual: No impairment  Velum: No Impairment  Mandible: No impairment    P.O. Trials:  Assessment Method(s): Observation  Patient Position: Upright in bed  Vocal Quality: No Impairment  Consistency Presented: Thin;Pureed  How Presented: SLP-fed/Presented;Straw;Spoon  Bolus Acceptance: No impairment  Bolus Formation/Control: No impairment  Propulsion: No impairment  Oral Residue: None  Initiation of Swallow: Present  Laryngeal Elevation: Present  Aspiration Signs/Symptoms: Immediate regurgitation of PO boluses into emesis bag; Ongoing expectoration of clear sputum for >5 minutes after initial PO presentation.  Pharyngeal Phase Characteristics: Higher suspicion for primary esophageal etiology; however assessment ongoing as clinical course evolves.    Respiratory Status/Airway:  Room air    After treatment:   Patient left in no apparent distress in bed, Call bell left within reach, Nursing notified, and Caregiver present (patient's father)    COMMUNICATION/EDUCATION:   The patient's plan of care including recommendations, planned interventions were discussed with: Registered nurse and Physician    Patient/family have participated as able in goal setting and plan of care and Patient/family agree to work toward stated goals and plan of care    Thank you,  Teressa Busch, SLP    SLP Individual Minutes  Time In: 1355  Time Out: 1410  Minutes: 15      Problem: SLP Adult - Impaired Swallowing  Goal: By Discharge: Advance to least restrictive diet without signs or symptoms of aspiration for planned discharge setting.  See evaluation for individualized goals.  Description: Speech Therapy

## 2025-02-10 NOTE — PROGRESS NOTES
Comprehensive Nutrition Assessment    Type and Reason for Visit: Initial, Positive nutrition screen    Nutrition Recommendations/Plan:   Continue PO diet as tolerated/ consistency per SLP vs GI  Recommend keeping diet as liberal as possible with poor intakes (remove cardiac, Na+, and K+ restrictions).    May ultimately need a low Phos diet or binders, but serum Phos is rising despite pt's lack of oral intakes, so not sure how a diet restriction would help at this time.  Further recs to follow pending GI involvement/ pt's ability to get PO down.       Malnutrition Assessment:  Malnutrition Status:  Insufficient data (02/10/25 1500)         Nutrition Assessment:    PMHx includes IgA nephropathy and epilepsy.  Presented with poor appetite and fatigue.  Pt admitted with PEMA on CKD IV, now new onset ESRD  HD c/b seizures--> takes Dilantin at home, but missed x 4 days PTA + dilantin is highly dialyzable, now switched to Keppra.  Course further c/b N/V, ?r/t new HD.    Furthermore, endorses swallowing difficulty since seizure event.  SLP consulted, pt with immediate regurgitation of consistencies tried. RN indicates ongoing throughout the day. SLP with higher suspicion for primary esophageal etiology vs pharyngeal dysphagia based on clinical presentation and recommends GI involvement.    MST received for weight loss and poor PO PTA.    Pt endorses poor appetite/ PO intake for past 1 month.  However, N/V and swallowing concerns started this admission.  Awaiting GI eval.  Phos high on admission and has been rising despite poor PO intake and dialysis.  He is not on Phos diet restriction, but ?benefit with poor intakes already and ?if would even benefit from a Phos binder if not eating.  Consider keeping diet liberal until better intakes established.    Nutritionally Significant Medications:  Epo, protonix, cardene gtt  PRN: heparin, tums, zofran      Estimated Daily Nutrient Needs:  Energy Requirements Based On:

## 2025-02-10 NOTE — FLOWSHEET NOTE
PRE TREATMENT   02/10/25 0851   Observations & Evaluations   Level of Consciousness 0   Heart Rhythm Irregular   Respiratory Quality/Effort Unlabored   O2 Device None (Room air)   Bilateral Breath Sounds Clear   Skin Condition/Temp Dry;Warm   Appetite Poor  (patient's mother reports)   Abdomen Inspection Obese;Soft   Edema None   Vital Signs   Temp 98.5 °F (36.9 °C)   Pain Assessment   Pain Assessment None - Denies Pain   Pain Level 0   During Hemodialysis Assessment   Comments /63  Temp 98.5 R 14 SP02 100% RA   Hemodialysis Central Access - Temporary Right Neck   Placement Date/Time: 02/07/25 1636   Present on Admission/Arrival: No  Inserted by: Minna guzman  Insertion Practices: Chlorohexadine skin antisepsis;Hand hygiene;Maximal barrier precautions;Optimal catheter site selection;Sterile ultrasound technique...   Continued need for line? Yes   Site Assessment Clean, dry & intact   CVC Lumen Status Flushed;Brisk blood return   Blue Lumen Status Flushed;Brisk blood return   Red Lumen Status Flushed;Brisk blood return   Line Care Connections checked and tightened;Chlorhexidine wipes;Line pulled back;Ports disinfected   Dressing Type Antimicrobial   Date of Last Dressing Change 02/08/25   Dressing Status Clean, dry & intact        02/10/25 0902   Technical Checks   Dialysis Machine No. [id number]  (07)   RO Machine Number [id number]  (R07)   Dialyzer Lot No. [id number]   Tubing Lot Number [id number]   All Connections Secure Yes   NS Bag Yes   Saline Line Double Clamped Yes   Dialyzer Nipro ELISIO   Prime Volume (mL) 200 mL   ICEBOAT I;C;E;B;O;A;T   RO Machine Log Sheet Completed Yes   Machine Alarm Self Test Completed;Passed   Air Foam Detector Proper Function;Tested;pH Reading   Extracorporeal Circuit Tested for Integrity Yes   Machine Conductivity 13.9   Manual Ph 7.4   Bleach Test (Neg) Yes   Bath Temperature 96.8 °F (36 °C)   Dialysis Bath   K+ (Potassium) 3   Ca+ (Calcium) 2.5   Na+ (Sodium) 138   HCO3

## 2025-02-11 ENCOUNTER — ANESTHESIA (OUTPATIENT)
Facility: HOSPITAL | Age: 45
End: 2025-02-11
Payer: MEDICARE

## 2025-02-11 ENCOUNTER — ANESTHESIA EVENT (OUTPATIENT)
Facility: HOSPITAL | Age: 45
End: 2025-02-11
Payer: MEDICARE

## 2025-02-11 LAB
-: NORMAL
ALBUMIN SERPL-MCNC: 3 G/DL (ref 3.5–5)
ANION GAP SERPL CALC-SCNC: 16 MMOL/L (ref 2–12)
BUN SERPL-MCNC: 53 MG/DL (ref 6–20)
BUN/CREAT SERPL: 6 (ref 12–20)
CALCIUM SERPL-MCNC: 7.8 MG/DL (ref 8.5–10.1)
CHLORIDE SERPL-SCNC: 104 MMOL/L (ref 97–108)
CO2 SERPL-SCNC: 22 MMOL/L (ref 21–32)
CREAT SERPL-MCNC: 9.11 MG/DL (ref 0.7–1.3)
ERYTHROCYTE [DISTWIDTH] IN BLOOD BY AUTOMATED COUNT: 12.6 % (ref 11.5–14.5)
GLUCOSE SERPL-MCNC: 79 MG/DL (ref 65–100)
HAV IGM SER QL: NONREACTIVE
HBV CORE IGM SER QL: NONREACTIVE
HBV SURFACE AG SER QL: <0.1 INDEX
HBV SURFACE AG SER QL: NEGATIVE
HCT VFR BLD AUTO: 25.5 % (ref 36.6–50.3)
HCV AB SER IA-ACNC: 0.1 INDEX
HCV AB SERPL QL IA: NONREACTIVE
HGB BLD-MCNC: 8.7 G/DL (ref 12.1–17)
MCH RBC QN AUTO: 27.8 PG (ref 26–34)
MCHC RBC AUTO-ENTMCNC: 34.1 G/DL (ref 30–36.5)
MCV RBC AUTO: 81.5 FL (ref 80–99)
NRBC # BLD: 0 K/UL (ref 0–0.01)
NRBC BLD-RTO: 0 PER 100 WBC
PHOSPHATE SERPL-MCNC: 6.4 MG/DL (ref 2.6–4.7)
PLATELET # BLD AUTO: 265 K/UL (ref 150–400)
PMV BLD AUTO: 9.5 FL (ref 8.9–12.9)
POTASSIUM SERPL-SCNC: 3.7 MMOL/L (ref 3.5–5.1)
RBC # BLD AUTO: 3.13 M/UL (ref 4.1–5.7)
SODIUM SERPL-SCNC: 142 MMOL/L (ref 136–145)
WBC # BLD AUTO: 7.5 K/UL (ref 4.1–11.1)

## 2025-02-11 PROCEDURE — 6360000002 HC RX W HCPCS: Performed by: INTERNAL MEDICINE

## 2025-02-11 PROCEDURE — 0DJ08ZZ INSPECTION OF UPPER INTESTINAL TRACT, VIA NATURAL OR ARTIFICIAL OPENING ENDOSCOPIC: ICD-10-PCS | Performed by: INTERNAL MEDICINE

## 2025-02-11 PROCEDURE — 85027 COMPLETE CBC AUTOMATED: CPT

## 2025-02-11 PROCEDURE — 2720000010 HC SURG SUPPLY STERILE: Performed by: INTERNAL MEDICINE

## 2025-02-11 PROCEDURE — 6360000002 HC RX W HCPCS: Performed by: NURSE ANESTHETIST, CERTIFIED REGISTERED

## 2025-02-11 PROCEDURE — 3700000000 HC ANESTHESIA ATTENDED CARE: Performed by: INTERNAL MEDICINE

## 2025-02-11 PROCEDURE — 36415 COLL VENOUS BLD VENIPUNCTURE: CPT

## 2025-02-11 PROCEDURE — 80074 ACUTE HEPATITIS PANEL: CPT

## 2025-02-11 PROCEDURE — 2580000003 HC RX 258: Performed by: NURSE ANESTHETIST, CERTIFIED REGISTERED

## 2025-02-11 PROCEDURE — 3600007502: Performed by: INTERNAL MEDICINE

## 2025-02-11 PROCEDURE — 7100000010 HC PHASE II RECOVERY - FIRST 15 MIN: Performed by: INTERNAL MEDICINE

## 2025-02-11 PROCEDURE — 2060000000 HC ICU INTERMEDIATE R&B

## 2025-02-11 PROCEDURE — 6360000002 HC RX W HCPCS: Performed by: NURSE PRACTITIONER

## 2025-02-11 PROCEDURE — 2500000003 HC RX 250 WO HCPCS: Performed by: FAMILY MEDICINE

## 2025-02-11 PROCEDURE — 7100000011 HC PHASE II RECOVERY - ADDTL 15 MIN: Performed by: INTERNAL MEDICINE

## 2025-02-11 PROCEDURE — 6360000002 HC RX W HCPCS: Performed by: FAMILY MEDICINE

## 2025-02-11 PROCEDURE — 80069 RENAL FUNCTION PANEL: CPT

## 2025-02-11 RX ORDER — LIDOCAINE HYDROCHLORIDE 20 MG/ML
INJECTION, SOLUTION EPIDURAL; INFILTRATION; INTRACAUDAL; PERINEURAL
Status: DISCONTINUED | OUTPATIENT
Start: 2025-02-11 | End: 2025-02-11 | Stop reason: SDUPTHER

## 2025-02-11 RX ORDER — METOPROLOL TARTRATE 50 MG
50 TABLET ORAL 2 TIMES DAILY
Status: DISCONTINUED | OUTPATIENT
Start: 2025-02-11 | End: 2025-02-15 | Stop reason: HOSPADM

## 2025-02-11 RX ORDER — 0.9 % SODIUM CHLORIDE 0.9 %
INTRAVENOUS SOLUTION INTRAVENOUS
Status: DISCONTINUED | OUTPATIENT
Start: 2025-02-11 | End: 2025-02-11 | Stop reason: SDUPTHER

## 2025-02-11 RX ORDER — SODIUM CHLORIDE 0.9 % (FLUSH) 0.9 %
5-40 SYRINGE (ML) INJECTION EVERY 12 HOURS SCHEDULED
Status: DISCONTINUED | OUTPATIENT
Start: 2025-02-11 | End: 2025-02-11 | Stop reason: HOSPADM

## 2025-02-11 RX ORDER — SODIUM CHLORIDE 9 MG/ML
INJECTION, SOLUTION INTRAVENOUS PRN
Status: DISCONTINUED | OUTPATIENT
Start: 2025-02-11 | End: 2025-02-11 | Stop reason: HOSPADM

## 2025-02-11 RX ORDER — NICARDIPINE HYDROCHLORIDE 0.1 MG/ML
2.5-15 INJECTION INTRAVENOUS CONTINUOUS
Status: DISCONTINUED | OUTPATIENT
Start: 2025-02-11 | End: 2025-02-12 | Stop reason: SDUPTHER

## 2025-02-11 RX ORDER — SODIUM CHLORIDE 9 MG/ML
INJECTION, SOLUTION INTRAVENOUS CONTINUOUS
Status: DISCONTINUED | OUTPATIENT
Start: 2025-02-11 | End: 2025-02-11 | Stop reason: HOSPADM

## 2025-02-11 RX ORDER — SODIUM CHLORIDE 0.9 % (FLUSH) 0.9 %
5-40 SYRINGE (ML) INJECTION PRN
Status: DISCONTINUED | OUTPATIENT
Start: 2025-02-11 | End: 2025-02-11 | Stop reason: HOSPADM

## 2025-02-11 RX ADMIN — HEPARIN SODIUM 5000 UNITS: 5000 INJECTION INTRAVENOUS; SUBCUTANEOUS at 20:52

## 2025-02-11 RX ADMIN — ONDANSETRON 4 MG: 2 INJECTION INTRAMUSCULAR; INTRAVENOUS at 08:52

## 2025-02-11 RX ADMIN — SODIUM CHLORIDE, PRESERVATIVE FREE 10 ML: 5 INJECTION INTRAVENOUS at 08:50

## 2025-02-11 RX ADMIN — HEPARIN SODIUM 5000 UNITS: 5000 INJECTION INTRAVENOUS; SUBCUTANEOUS at 07:24

## 2025-02-11 RX ADMIN — SODIUM CHLORIDE, PRESERVATIVE FREE 10 ML: 5 INJECTION INTRAVENOUS at 20:52

## 2025-02-11 RX ADMIN — LIDOCAINE HYDROCHLORIDE 40 MG: 20 INJECTION, SOLUTION EPIDURAL; INFILTRATION; INTRACAUDAL; PERINEURAL at 16:07

## 2025-02-11 RX ADMIN — NICARDIPINE HYDROCHLORIDE 6 MG/HR: 0.1 INJECTION, SOLUTION INTRAVENOUS at 00:58

## 2025-02-11 RX ADMIN — NICARDIPINE HYDROCHLORIDE 4 MG/HR: 0.1 INJECTION INTRAVENOUS at 11:42

## 2025-02-11 RX ADMIN — PROPOFOL 100 MG: 10 INJECTION, EMULSION INTRAVENOUS at 16:12

## 2025-02-11 RX ADMIN — NICARDIPINE HYDROCHLORIDE 7 MG/HR: 0.1 INJECTION, SOLUTION INTRAVENOUS at 04:28

## 2025-02-11 RX ADMIN — HEPARIN SODIUM 5000 UNITS: 5000 INJECTION INTRAVENOUS; SUBCUTANEOUS at 16:59

## 2025-02-11 RX ADMIN — NICARDIPINE HYDROCHLORIDE 3 MG/HR: 0.1 INJECTION INTRAVENOUS at 17:34

## 2025-02-11 RX ADMIN — HYDRALAZINE HYDROCHLORIDE 20 MG: 20 INJECTION INTRAMUSCULAR; INTRAVENOUS at 20:53

## 2025-02-11 RX ADMIN — ONDANSETRON 4 MG: 2 INJECTION INTRAMUSCULAR; INTRAVENOUS at 01:18

## 2025-02-11 RX ADMIN — ONDANSETRON 4 MG: 2 INJECTION INTRAMUSCULAR; INTRAVENOUS at 19:02

## 2025-02-11 RX ADMIN — PROPOFOL 200 MG: 10 INJECTION, EMULSION INTRAVENOUS at 16:07

## 2025-02-11 RX ADMIN — NICARDIPINE HYDROCHLORIDE 7 MG/HR: 0.1 INJECTION, SOLUTION INTRAVENOUS at 08:12

## 2025-02-11 RX ADMIN — NICARDIPINE HYDROCHLORIDE 8 MG/HR: 0.1 INJECTION INTRAVENOUS at 22:37

## 2025-02-11 RX ADMIN — SODIUM CHLORIDE 250 ML: 9 INJECTION, SOLUTION INTRAVENOUS at 16:07

## 2025-02-11 RX ADMIN — LEVETIRACETAM 500 MG: 100 INJECTION INTRAVENOUS at 08:49

## 2025-02-11 RX ADMIN — PROPOFOL 100 MG: 10 INJECTION, EMULSION INTRAVENOUS at 16:10

## 2025-02-11 ASSESSMENT — PAIN SCALES - GENERAL
PAINLEVEL_OUTOF10: 0

## 2025-02-11 ASSESSMENT — PAIN - FUNCTIONAL ASSESSMENT: PAIN_FUNCTIONAL_ASSESSMENT: NONE - DENIES PAIN

## 2025-02-11 NOTE — ANESTHESIA POSTPROCEDURE EVALUATION
Post-Anesthesia Evaluation and Assessment    Patient: Ward Bhatia MRN: [id number]  SSN: [id number]    YOB: 1980  Age: 44 y.o.  Sex: male      I have evaluated the patient and they are stable and ready for discharge from the PACU.     Cardiovascular Function/Vital Signs  Visit Vitals  BP (!) 148/87   Pulse (!) 120   Temp 98.7 °F (37.1 °C) (Temporal)   Resp 19   Ht 1.702 m (5' 7\")   Wt 111.1 kg (245 lb)   SpO2 95%   BMI 38.37 kg/m²       Patient is status post Monitor Anesthesia Care anesthesia for Procedure(s):  ESOPHAGOGASTRODUODENOSCOPY.    Nausea/Vomiting: None    Postoperative hydration reviewed and adequate.    Pain:      Managed    Neurological Status:       At baseline    Mental Status, Level of Consciousness: Alert and  oriented to person, place, and time    Pulmonary Status:       Adequate oxygenation and airway patent    Complications related to anesthesia: None    Post-anesthesia assessment completed. No concerns    Signed By: Irvin Storm MD     February 11, 2025

## 2025-02-11 NOTE — CARE COORDINATION
Transition of Care Plan:    RUR: 16%  Prior Level of Functioning: dependent      REJI: TBD following set up of outpatient HD and established chair  [facility]    Follow up appointments: PCP/Specialists  DME needed:     Transportation at discharge: family    IM/IMM Medicare/ letter given:   Caregiver Contact: mom Rosemary Brown (Legal Guardian    CM met with patient and his dad at bedside.  CM introduced self and role.  Discharge Caregiver contacted prior to discharge? yes      Barriers to discharge:  locating HD chair.     HD Chair Updates:      Elisabet Guerrero, DANIELLEW, CRM  [phone]

## 2025-02-11 NOTE — PROGRESS NOTES
2/10 noc shift, patient unable to tolerate PO intake, 1x vomiting session, zofran effective

## 2025-02-11 NOTE — PERIOP NOTE
TRANSFER - IN REPORT:    Verbal report received from KARIN Wiley on Ward Bhatia  being received from 447 for ordered procedure      Information from the following report(s) ED Encounter Summary, Adult Overview, MAR, Recent Results, Pre Procedure Checklist, Procedure Verification, and Quality Measures was reviewed with the receiving nurse.     Pt on titrated drip for BP. Bedside nurse to stay with patient for procedure. Confirmed pt's NPO status.    Opportunity for questions and clarification was provided.

## 2025-02-11 NOTE — PROGRESS NOTES
Initial RN admission and assessment performed and documented in Endoscopy navigator.     Patient evaluated by anesthesia in pre-procedure holding.     All procedural vital signs, airway assessment, and level of consciousness information monitored and recorded by anesthesia staff on the anesthesia record.     Report received from CRNA post procedure.  Patient transported to recovery area by RN.    Endoscopy post procedure time out was performed and 0 specimens were confirmed.    Endoscope was pre-cleaned at bedside immediately following procedure by ELIAZAR Estrada.

## 2025-02-11 NOTE — PERIOP NOTE
TRANSFER - OUT REPORT:    Verbal report given to KARIN Wiley on Ward Bhatia  being transferred to  for routine progression of patient care       Information from the following report(s) Nurse Handoff Report, Surgery Report, and Recent Results was reviewed with the receiving nurse.           Lines:   Peripheral IV 02/08/25 Posterior;Right Hand (Active)   Site Assessment Clean, dry & intact 02/11/25 1119   Line Status Capped;Flushed;Normal saline locked 02/11/25 1119   Line Care Connections checked and tightened;Cap changed 02/11/25 1119   Phlebitis Assessment No symptoms 02/11/25 1119   Infiltration Assessment 0 02/11/25 1119   Alcohol Cap Used Yes 02/11/25 1119   Dressing Status Clean, dry & intact 02/11/25 1119   Dressing Type Transparent 02/11/25 1119       Peripheral IV 02/11/25 Distal;Right Forearm (Active)       Hemodialysis Central Access - Temporary Right Neck (Active)   $Hemodialysis Central Access $Yes 02/07/25 1637   Continued need for line? Yes 02/11/25 0444   Site Assessment Clean, dry & intact 02/11/25 1119   CVC Lumen Status Flushed;Heparin locked 02/11/25 0444   Blue Lumen Status Flushed;Heparin locked 02/11/25 0444   Red Lumen Status Flushed;Heparin locked 02/11/25 0444   Line Care Cap changed;Connections checked and tightened;Line pulled back;Ports disinfected 02/11/25 0444   Dressing Type Antimicrobial 02/11/25 0444   Date of Last Dressing Change 02/11/25 02/11/25 1020   Dressing Status Clean, dry & intact 02/11/25 1119   Dressing Intervention New;Dressing changed 02/08/25 1715        Opportunity for questions and clarification was provided.      Patient transported with:  Registered Nurse and Transport

## 2025-02-11 NOTE — OP NOTE
[facility]        Esophago- Gastroduodenoscopy (EGD) Procedure Note    Ward Bhatia  1980  [id number]      Procedure: Endoscopic Gastroduodenoscopy --diagnostic    Indication: dysphagia      Pre-operative Diagnosis: see indication above    Post-operative Diagnosis: see findings below    : Jose G Casarez MD    Surgical Assistant: Circulator: Margo Olmos RN  Endoscopy Technician: Mil Estrada    Implants:  None    Referring Provider:  No primary care provider on file.      Anesthesia/Sedation:  MAC anesthesia Propofol        Procedure Details     After infomed consent was obtained for the procedure, with all risks and benefits of procedure explained the patient was taken to the endoscopy suite and placed in the left lateral decubitus position.  Following sequential administration of sedation as per above, the endoscope was inserted into the mouth and advanced under direct vision to third portion of the duodenum.  A careful inspection was made as the gastroscope was withdrawn, including a retroflexed view of the proximal stomach; findings and interventions are described below.      Findings:   Esophagus:hiatal hernia 3 cm in size   Rest of esophagus was normal     Stomach: normal   Duodenum: normal      Therapies:  none    Specimens: none         EBL: None      Complications:   None; patient tolerated the procedure well.           Impression:    -See post-procedure diagnoses.    Recommendations:  -Continue acid suppression with protonix   -resume PO diet  -renal and metabolic management per nephrology   -discussed all that with his dad   -will follow as needed     Signed By: Jose G Casarez MD     2/11/2025  4:17 PM

## 2025-02-11 NOTE — CARE COORDINATION
Brief Note:  Referral sent to [facility] Admissions:    Patient Tracking number is [id number].  [facility]  currently has T/TH/Sat  1st Shift 6:00am-6:45am.  Note other times are available.  [facility]  Admissions also requesting   Hep B Core Antibody results.     Orders discussed with Dr. Smith. CM also informed that [facility] may have availability as well.    CM continuing to follow.  JOSE Gonzalez, CRM  [phone]

## 2025-02-11 NOTE — ANESTHESIA PRE PROCEDURE
Department of Anesthesiology  Preprocedure Note       Name:  Ward Bhatia   Age:  44 y.o.  :  1980                                          MRN:  [id number]         Date:  2025      Surgeon: Surgeon(s):  Jose G Casarez MD    Procedure: Procedure(s):  ESOPHAGOGASTRODUODENOSCOPY    Medications prior to admission:   Prior to Admission medications    Medication Sig Start Date End Date Taking? Authorizing Provider   phenytoin (DILANTIN) 100 MG ER capsule Take 1 capsule by mouth 5 times daily   Yes ProviderLayton MD   hydrOXYzine HCl (ATARAX) 25 MG tablet Take 1 tablet by mouth as needed    Provider, MD Layton       Current medications:    Current Facility-Administered Medications   Medication Dose Route Frequency Provider Last Rate Last Admin   • metoprolol tartrate (LOPRESSOR) tablet 50 mg  50 mg Oral BID Devonte Bearden MD       • niCARdipine (CARDENE) 20 mg in 0.9 % sodium chloride 200 mL solution  2.5-15 mg/hr IntraVENous Continuous Christine Hightower APRN - NP 40 mL/hr at 25 1243 4 mg/hr at 25 1243   • 0.9 % sodium chloride infusion   IntraVENous Continuous Jose G Casarez MD       • sodium chloride flush 0.9 % injection 5-40 mL  5-40 mL IntraVENous 2 times per day Jose G Casarez MD       • sodium chloride flush 0.9 % injection 5-40 mL  5-40 mL IntraVENous PRN Jose G Casarez MD       • 0.9 % sodium chloride infusion   IntraVENous PRN Jose G Casarez MD       • albumin human 25% IV solution 25 g  25 g IntraVENous PRN Carson Horne MD       • losartan (COZAAR) tablet 100 mg  100 mg Oral Daily Devonte Bearden MD       • amLODIPine (NORVASC) tablet 10 mg  10 mg Oral Daily Devonte Bearden MD       • amLODIPine (NORVASC) tablet 5 mg  5 mg Oral Once Devonte Bearden MD       • losartan (COZAAR) tablet 50 mg  50 mg Oral Once Devonte Bearden MD       • doxazosin (CARDURA) tablet 1 mg  1 mg Oral Daily Devonte Bearden MD       • Epoetin Milton-epbx (RETACRIT) injection 20,000

## 2025-02-11 NOTE — PLAN OF CARE
Problem: Discharge Planning  Goal: Discharge to home or other facility with appropriate resources  2/11/2025 0909 by Daylin Bolton, RN  Outcome: Progressing  2/11/2025 0443 by Sravan Bowden, RN  Outcome: Progressing  Flowsheets (Taken 2/10/2025 2000)  Discharge to home or other facility with appropriate resources: Identify barriers to discharge with patient and caregiver     Problem: Pain  Goal: Verbalizes/displays adequate comfort level or baseline comfort level  2/11/2025 0909 by Daylin Bolton, RN  Outcome: Progressing  2/11/2025 0443 by Sravan Bowden, RN  Outcome: Progressing     Problem: Safety - Adult  Goal: Free from fall injury  2/11/2025 0909 by Daylin Bolton, RN  Outcome: Progressing  2/11/2025 0443 by Sravan Bowden, RN  Outcome: Progressing

## 2025-02-11 NOTE — PROGRESS NOTES
or agitated  Skin:  No visible rashes. No jaundice    Data Review:   I have personally and independently reviewed all pertinent labs, diagnostic studies, imaging, and have provided independent interpretation of the same.    Labs:     Recent Labs     02/10/25  0547 02/11/25  0611   WBC 6.3 7.5   HGB 8.2* 8.7*   HCT 24.0* 25.5*    265     Recent Labs     02/09/25  0524 02/10/25  0547 02/11/25  0611    144 142   K 3.6 3.9 3.7    110* 104   CO2 20* 17* 22   BUN 85* 98* 53*   MG 1.8  --   --    PHOS 7.9* 8.2* 6.4*     No results for input(s): \"ALT\", \"TP\", \"GLOB\", \"GGT\" in the last 72 hours.    Invalid input(s): \"SGOT\", \"GPT\", \"AP\", \"TBIL\", \"TBILI\", \"ALB\", \"AML\", \"AMYP\", \"LPSE\", \"HLPSE\"    No results for input(s): \"INR\", \"APTT\" in the last 72 hours.    Invalid input(s): \"PTP\"   No results for input(s): \"TIBC\" in the last 72 hours.    Invalid input(s): \"FE\", \"PSAT\", \"FERR\"     No results found for: \"RBCF\"   No results for input(s): \"PH\", \"PCO2\", \"PO2\" in the last 72 hours.  No results for input(s): \"CPK\" in the last 72 hours.    Invalid input(s): \"CPKMB\", \"CKNDX\", \"TROIQ\"  No results found for: \"CHOL\", \"CHLST\", \"CHOLV\", \"HDL\", \"HDLC\", \"LDL\", \"LDLC\"  No results found for: \"GLUCPOC\"  Lab Results   Component Value Date/Time    APPEARANCE CLEAR 02/07/2025 09:30 AM    COLORU YELLOW/STRAW 02/07/2025 09:30 AM    NITRU Negative 02/07/2025 09:30 AM    GLUCOSEU 100 02/07/2025 09:30 AM    KETUA Negative 02/07/2025 09:30 AM    UROBILINOGEN 0.2 02/07/2025 09:30 AM    BILIRUBINUR Negative 02/07/2025 09:30 AM       Notes reviewed from all clinical/nonclinical/nursing services involved in patient's clinical care. Care coordination discussions were held with appropriate clinical/nonclinical/ nursing providers based on care coordination needs.     Patients current active Medications were reviewed, considered, added and adjusted based on the clinical condition today.      Medications Reviewed:     Current

## 2025-02-11 NOTE — CONSULTS
[facility]  ERIKA Jaimes  [phone]                    GASTROENTEROLOGY CONSULTATION NOTE              NAME:  Ward Bhatia   :   1980   MRN:   [id number]       Referring Physician:   Suleiman      Consult Date:   2025 11:19 AM    Chief Complaint:    Decreased appetite/fatigue     History of Present Illness:    Patient is a 44 y.o. male with history of IGA nephropathy and cognitive delay who is admitted with acute on chronic renal failure.     He has been seen by nephrology and started on HD given progressive kidney disease.    We were consulted for evaluation of decreased appetite and dysphagia.    Mother at bedside helps with history - she has noticed he has had more difficulty with swallowing and regurgitating when trying to swallow.    SLP has seen patient and feels that his oropharyngeal swallowing mechanism is intact and there are concerns there maybe an underlying GI source to his symptoms.     He received HD yesterday and is on a MWF schedule.      PMH:  No past medical history on file.    PSH:  Past Surgical History:   Procedure Laterality Date    CT BIOPSY RENAL  2024    CT BIOPSY RENAL 2024 [facility] RAD CT    IR INSERT NON TUNNELED CV CATH <5 YEARS  2025    IR INSERT NON TUNNELED CV CATH <5 YEARS 2025 [facility] RAD ANGIO IR       Allergies:  No Known Allergies    Home Medications:  Prior to Admission Medications   Prescriptions Last Dose Informant Patient Reported? Taking?   hydrOXYzine HCl (ATARAX) 25 MG tablet   Yes No   Sig: Take 1 tablet by mouth as needed   phenytoin (DILANTIN) 100 MG ER capsule 2025  Yes Yes   Sig: Take 1 capsule by mouth 5 times daily      Facility-Administered Medications: None       Hospital Medications:  Current Facility-Administered Medications   Medication Dose Route Frequency    metoprolol tartrate (LOPRESSOR) tablet 50 mg  50 mg Oral BID    albumin human 25% IV solution 25 g  25 g IntraVENous PRN    losartan (COZAAR) tablet  file.    Review of Systems:  Constitutional: negative fever, negative chills, negative weight loss  Eyes:   negative visual changes  ENT:   negative sore throat, tongue or lip swelling  Respiratory:  negative cough, negative dyspnea  Cards:  negative for chest pain, palpitations, lower extremity edema  GI:   See HPI  :  negative for frequency, dysuria  Integument:  negative for rash and pruritus  Heme:  negative for easy bruising and gum/nose bleeding  Musculoskel: negative for myalgias,  back pain and muscle weakness  Neuro: negative for headaches, dizziness, vertigo  Psych:  negative for feelings of anxiety, depression     Objective:   Patient Vitals for the past 8 hrs:   BP Temp Temp src Pulse Resp SpO2 Weight   02/11/25 1047 -- -- -- (!) 110 -- -- --   02/11/25 1019 (!) 147/82 -- -- -- -- -- --   02/11/25 0900 -- -- -- -- -- 99 % --   02/11/25 0845 (!) 141/81 -- Oral (!) 117 23 95 % --   02/11/25 0800 (!) 155/96 -- -- (!) 108 25 99 % --   02/11/25 0600 -- -- -- (!) 119 -- -- --   02/11/25 0516 -- -- -- -- -- -- 111.2 kg (245 lb 1.6 oz)   02/11/25 0415 -- -- -- -- -- 100 % --   02/11/25 0400 (!) 163/88 99.3 °F (37.4 °C) Oral (!) 120 26 -- --   02/11/25 0357 -- -- -- (!) 118 -- -- --     No intake/output data recorded.  02/09 1901 - 02/11 0700  In: 3068.9 [P.O.:170; I.V.:2398.9]  Out: 2500     EXAM:     NEURO- awake and alert though not oriented and does not provide any history to me   HEENT-Head: Normocephalic, no lesions, without obvious abnormality.  Eye: Normal external eye, conjunctiva, lids cornea, CORNELIUS.   LUNGS-clear to auscultation bilaterally    COR-regular rate and rhythym     ABD- soft, non-tender. Bowel sounds normal. No masses,  no organomegaly     EXT-no edema    Skin - No rash     Data Review     Recent Labs     02/10/25  0547 02/11/25  0611   WBC 6.3 7.5   HGB 8.2* 8.7*   HCT 24.0* 25.5*    265     Recent Labs     02/10/25  0547 02/11/25  0611    142   K 3.9 3.7   * 104   CO2

## 2025-02-11 NOTE — PROGRESS NOTES
[address], [facility] [place] [address]  Phone: [phone]   [phone]    [email or web address]     Nephrology Progress Note    Patient Name : Ward Bhatia      : 1980     MRN : [id number]  Date of Admission : 2025  Date of Servive : 25    CC:  Follow up for ESRD       Assessment and Plan   New Onset ESRD :   - 2/2 Chronic crescentic Ig A nephropathy   - HD MWF  - NPO for PC tomorrow  - family interested in PD in the future  - CM to work on outpt unit    Hyperkalemia   Metabolic acidosis     Seizure Disorder :   - missed dilantin for 4 days PTA  - break through seizure   - Now on Keppra     Anemia in CKD  - start JEROME  - iron ok     HTN:  - cont current oral meds  - wean off cardene drip    Dysphagia:  - per GI    Obesity   Anxiety          Interval History:  Seen and examined .  Doing well overall.  Tolerated HD yesterday. No cp, sob.  Ongoing dysphagia.      Review of Systems: Pertinent items are noted in HPI.    Current Medications:   Current Facility-Administered Medications   Medication Dose Route Frequency    metoprolol tartrate (LOPRESSOR) tablet 50 mg  50 mg Oral BID    albumin human 25% IV solution 25 g  25 g IntraVENous PRN    losartan (COZAAR) tablet 100 mg  100 mg Oral Daily    amLODIPine (NORVASC) tablet 10 mg  10 mg Oral Daily    amLODIPine (NORVASC) tablet 5 mg  5 mg Oral Once    losartan (COZAAR) tablet 50 mg  50 mg Oral Once    doxazosin (CARDURA) tablet 1 mg  1 mg Oral Daily    Epoetin Milton-epbx (RETACRIT) injection 20,000 Units  20,000 Units SubCUTAneous Once per day on     levETIRAcetam (KEPPRA) injection 500 mg  500 mg IntraVENous Once per day on     pantoprazole (PROTONIX) tablet 40 mg  40 mg Oral QAM AC    calcium carbonate (TUMS) chewable tablet 500 mg  500 mg Oral BID PRN    niCARdipine (CARDENE) 20 mg in 0.86 % sodium chloride 200 mL infusion  2.5-15 mg/hr

## 2025-02-12 ENCOUNTER — APPOINTMENT (OUTPATIENT)
Facility: HOSPITAL | Age: 45
End: 2025-02-12
Payer: MEDICARE

## 2025-02-12 LAB
ALBUMIN SERPL-MCNC: 3.1 G/DL (ref 3.5–5)
ANION GAP SERPL CALC-SCNC: 18 MMOL/L (ref 2–12)
BUN SERPL-MCNC: 72 MG/DL (ref 6–20)
BUN/CREAT SERPL: 6 (ref 12–20)
CALCIUM SERPL-MCNC: 8.2 MG/DL (ref 8.5–10.1)
CHLORIDE SERPL-SCNC: 107 MMOL/L (ref 97–108)
CO2 SERPL-SCNC: 18 MMOL/L (ref 21–32)
COMMENT:: NORMAL
CREAT SERPL-MCNC: 11.1 MG/DL (ref 0.7–1.3)
ERYTHROCYTE [DISTWIDTH] IN BLOOD BY AUTOMATED COUNT: 12.6 % (ref 11.5–14.5)
GLUCOSE SERPL-MCNC: 91 MG/DL (ref 65–100)
HCT VFR BLD AUTO: 24.8 % (ref 36.6–50.3)
HGB BLD-MCNC: 8.4 G/DL (ref 12.1–17)
MCH RBC QN AUTO: 27.2 PG (ref 26–34)
MCHC RBC AUTO-ENTMCNC: 33.9 G/DL (ref 30–36.5)
MCV RBC AUTO: 80.3 FL (ref 80–99)
NRBC # BLD: 0 K/UL (ref 0–0.01)
NRBC BLD-RTO: 0 PER 100 WBC
PHOSPHATE SERPL-MCNC: 6.2 MG/DL (ref 2.6–4.7)
PLATELET # BLD AUTO: 284 K/UL (ref 150–400)
PMV BLD AUTO: 9.5 FL (ref 8.9–12.9)
POTASSIUM SERPL-SCNC: 3.5 MMOL/L (ref 3.5–5.1)
RBC # BLD AUTO: 3.09 M/UL (ref 4.1–5.7)
SODIUM SERPL-SCNC: 143 MMOL/L (ref 136–145)
SPECIMEN HOLD: NORMAL
WBC # BLD AUTO: 9 K/UL (ref 4.1–11.1)

## 2025-02-12 PROCEDURE — 6360000002 HC RX W HCPCS: Performed by: INTERNAL MEDICINE

## 2025-02-12 PROCEDURE — 6360000002 HC RX W HCPCS: Performed by: FAMILY MEDICINE

## 2025-02-12 PROCEDURE — 2060000000 HC ICU INTERMEDIATE R&B

## 2025-02-12 PROCEDURE — 2580000003 HC RX 258: Performed by: HOSPITALIST

## 2025-02-12 PROCEDURE — 90935 HEMODIALYSIS ONE EVALUATION: CPT

## 2025-02-12 PROCEDURE — 6370000000 HC RX 637 (ALT 250 FOR IP): Performed by: INTERNAL MEDICINE

## 2025-02-12 PROCEDURE — 92526 ORAL FUNCTION THERAPY: CPT

## 2025-02-12 PROCEDURE — 2500000003 HC RX 250 WO HCPCS: Performed by: FAMILY MEDICINE

## 2025-02-12 PROCEDURE — 80069 RENAL FUNCTION PANEL: CPT

## 2025-02-12 PROCEDURE — 6360000002 HC RX W HCPCS: Performed by: HOSPITALIST

## 2025-02-12 PROCEDURE — 85027 COMPLETE CBC AUTOMATED: CPT

## 2025-02-12 PROCEDURE — 6360000002 HC RX W HCPCS: Performed by: NURSE PRACTITIONER

## 2025-02-12 PROCEDURE — 2500000003 HC RX 250 WO HCPCS: Performed by: HOSPITALIST

## 2025-02-12 RX ORDER — METOPROLOL TARTRATE 1 MG/ML
2.5 INJECTION, SOLUTION INTRAVENOUS EVERY 6 HOURS
Status: DISCONTINUED | OUTPATIENT
Start: 2025-02-12 | End: 2025-02-14

## 2025-02-12 RX ORDER — ROPINIROLE 0.25 MG/1
0.25 TABLET, FILM COATED ORAL NIGHTLY
Status: DISCONTINUED | OUTPATIENT
Start: 2025-02-12 | End: 2025-02-15 | Stop reason: HOSPADM

## 2025-02-12 RX ADMIN — METOPROLOL TARTRATE 2.5 MG: 1 INJECTION, SOLUTION INTRAVENOUS at 15:07

## 2025-02-12 RX ADMIN — ROPINIROLE HYDROCHLORIDE 0.25 MG: 0.25 TABLET, FILM COATED ORAL at 21:24

## 2025-02-12 RX ADMIN — NICARDIPINE HYDROCHLORIDE 10.5 MG/HR: 0.1 INJECTION, SOLUTION INTRAVENOUS at 08:44

## 2025-02-12 RX ADMIN — EPOETIN ALFA-EPBX 20000 UNITS: 20000 INJECTION, SOLUTION INTRAVENOUS; SUBCUTANEOUS at 18:31

## 2025-02-12 RX ADMIN — NICARDIPINE HYDROCHLORIDE 2.5 MG/HR: 0.1 INJECTION, SOLUTION INTRAVENOUS at 22:16

## 2025-02-12 RX ADMIN — NICARDIPINE HYDROCHLORIDE 8 MG/HR: 0.1 INJECTION INTRAVENOUS at 01:07

## 2025-02-12 RX ADMIN — HEPARIN SODIUM 5000 UNITS: 5000 INJECTION INTRAVENOUS; SUBCUTANEOUS at 21:23

## 2025-02-12 RX ADMIN — ONDANSETRON 4 MG: 2 INJECTION INTRAMUSCULAR; INTRAVENOUS at 21:32

## 2025-02-12 RX ADMIN — NICARDIPINE HYDROCHLORIDE 9 MG/HR: 0.1 INJECTION, SOLUTION INTRAVENOUS at 12:19

## 2025-02-12 RX ADMIN — NICARDIPINE HYDROCHLORIDE 8 MG/HR: 0.1 INJECTION INTRAVENOUS at 03:33

## 2025-02-12 RX ADMIN — NICARDIPINE HYDROCHLORIDE 10.5 MG/HR: 0.1 INJECTION INTRAVENOUS at 06:22

## 2025-02-12 RX ADMIN — LEVETIRACETAM 500 MG: 100 INJECTION INTRAVENOUS at 21:23

## 2025-02-12 RX ADMIN — NICARDIPINE HYDROCHLORIDE 12 MG/HR: 0.1 INJECTION, SOLUTION INTRAVENOUS at 10:30

## 2025-02-12 RX ADMIN — METOPROLOL TARTRATE 2.5 MG: 1 INJECTION, SOLUTION INTRAVENOUS at 21:23

## 2025-02-12 RX ADMIN — LEVETIRACETAM 500 MG: 100 INJECTION INTRAVENOUS at 11:29

## 2025-02-12 RX ADMIN — PANTOPRAZOLE SODIUM 40 MG: 40 INJECTION, POWDER, FOR SOLUTION INTRAVENOUS at 15:08

## 2025-02-12 RX ADMIN — HYDRALAZINE HYDROCHLORIDE 20 MG: 20 INJECTION INTRAMUSCULAR; INTRAVENOUS at 06:22

## 2025-02-12 RX ADMIN — HEPARIN SODIUM 5000 UNITS: 5000 INJECTION INTRAVENOUS; SUBCUTANEOUS at 06:22

## 2025-02-12 RX ADMIN — NICARDIPINE HYDROCHLORIDE 5 MG/HR: 0.1 INJECTION, SOLUTION INTRAVENOUS at 18:56

## 2025-02-12 RX ADMIN — NICARDIPINE HYDROCHLORIDE 5 MG/HR: 0.1 INJECTION, SOLUTION INTRAVENOUS at 15:11

## 2025-02-12 RX ADMIN — HEPARIN SODIUM 5000 UNITS: 5000 INJECTION INTRAVENOUS; SUBCUTANEOUS at 14:51

## 2025-02-12 RX ADMIN — SODIUM CHLORIDE, PRESERVATIVE FREE 10 ML: 5 INJECTION INTRAVENOUS at 21:34

## 2025-02-12 ASSESSMENT — PAIN SCALES - GENERAL
PAINLEVEL_OUTOF10: 0

## 2025-02-12 NOTE — PROGRESS NOTES
1400: Angio not able to sedate patient safely for permacath d/t HR being elevated 120s. They will reassess tomorrow. Provider made aware    1454: Provider made aware that pts HR is staying in the 130's. IV medication placed.

## 2025-02-12 NOTE — PLAN OF CARE
Problem: Discharge Planning  Goal: Discharge to home or other facility with appropriate resources  Outcome: Progressing  Flowsheets (Taken 2/11/2025 2000)  Discharge to home or other facility with appropriate resources: Identify barriers to discharge with patient and caregiver     Problem: Pain  Goal: Verbalizes/displays adequate comfort level or baseline comfort level  Outcome: Progressing     Problem: Safety - Adult  Goal: Free from fall injury  Outcome: Progressing     Problem: Skin/Tissue Integrity  Goal: Skin integrity remains intact  Description: 1.  Monitor for areas of redness and/or skin breakdown  2.  Assess vascular access sites hourly  3.  Every 4-6 hours minimum:  Change oxygen saturation probe site  4.  Every 4-6 hours:  If on nasal continuous positive airway pressure, respiratory therapy assess nares and determine need for appliance change or resting period  Outcome: Progressing  Flowsheets (Taken 2/11/2025 2000)  Skin Integrity Remains Intact: Monitor for areas of redness and/or skin breakdown

## 2025-02-12 NOTE — PROGRESS NOTES
NUTRITION brief    Recommendations:   PO diet as tolerated/consistency per SLP vs GI   Trial Ensure Clear   Further recs to follow pending GI involvement/ pt's ability to get PO down  Might have to consider TF       Diet: NPO  Supplements/Nutrition Support: NPO  Nutrition-related meds: Retacrit, Porcine, Keppra, Lopressor, Nicardipine drip, Apresoline    New events impacting nutrition plan of care:   Pt had completed dialysis for the day. Spoke to father, who reports decreased appetite and probable weight loss since starting dialysis. Mentions that pt spends most of day sleeping and has had lower intake both at home and in admission. Pt has not been able tolerate PO. Discussed addition of  ONS, but pt's father mentions that his wife was told by MD that pt cannot have milk. W  Per chart review, pt was seen by SLP and found to have good swallowing function, not related to reported dyspragia. EGD performed  yesterday with no acute findings. Prior to being NPO, pt was placed on GI [place] (GERD/Peptic Ulcer) diet as part of acid suppression precautions.       See full RD assessment from 2/10 for additional details, goals, and monitoring/evaluation.   Estimated Nutrition Needs:   Energy Requirements Based On: Formula  Weight Used for Energy Requirements: Admission (114.6 kg)  Energy (kcal/day): 9944-7098 (MSJ x 1.1-1.3 or 28-33 kcal/kg adj BW of 79 kg)  Weight Used for Protein Requirements: Adjusted  Protein (g/day): 100-120 (1.2-1.5 gm/kg adj BW)     Fluid (ml/day): 500-1000 mL + OP    Manjit Esparza

## 2025-02-12 NOTE — FLOWSHEET NOTE
Primary RN SBAR: Daylin Bolton RN  Incapacitated Nurse [facility]. provided: YES  Patient Education provided: YES-GOAL  Preferred Education method and Primary language: VERBAL/English  Hospital General Consent Verified: YES  Hospital associated wait time; reason: 0  Hepatitis B Surface Ag   Date/Time Value Ref Range Status   02/11/2025 06:18 PM <0.10 Index Final     Hep B S Ag Interp   Date/Time Value Ref Range Status   02/11/2025 06:18 PM Negative NEG   Final     Hep B S Ab   Date/Time Value Ref Range Status   02/08/2025 12:58 AM <3.10 mIU/mL Final     Hep B S Ab Interp   Date/Time Value Ref Range Status   02/08/2025 12:58 AM NONREACTIVE NR   Final     Comment:     (NOTE)  The ADVIA Centaur Anti-HBs2 assay is traceable to the World Health   Organization (WHO) Hepatitis B Immunoglobulin 1st International   Reference Preparation (1977). Samples with a calculated value of 10   mIU/mL or greater are considered reactive (protective) in accordance   with the CDC guidelines. The accepted criteria for immunity to HBV is   anti-HBs activity greater than or equal to 10 mIU/mL, as defined by   the WHO International Reference Preparation.  Assay performance has not been established in pregnant women,   patients who are immunosuppressed or immunocompromised, nor have   performance characteristics been established in conjunction with   other 's assays for specific HBV serologic markers. This   assay does not differentiate between vaccine induced immune response   and a response due to infection with HBV. Passively acquired anti-HBs   may be identified following patient transfusion, receipt of   immunoglobulin products, etc.         Time out done, order parameters checked, Dialysis related medications and consent have been reviewed.  Treatment started with slow flow, gradually increased to ordered BF. Monitored closely.  Lines visible and secured at all time.  Extracorporeal lines flushed with 100CC NS to monitor for  clots.  Patients vitally stable.    NOTE: On cardine drip while on HD  For PC today pm         02/12/25 0730   Vital Signs   BP (!) 159/56   Temp 98.3 °F (36.8 °C)   Pulse (!) 118   Respirations 26   SpO2 96 %   Pain Assessment   Pain Assessment None - Denies Pain   Treatment   Time On 0807   Treatment Goal 3H/2L   Observations & Evaluations   Level of Consciousness 0   Oriented X 4   Heart Rhythm Regular  (Sinus Tachycardia)   Respiratory Quality/Effort Unlabored   O2 Device None (Room air)   Bilateral Breath Sounds Diminished   Skin Condition/Temp Warm   Appetite Poor   Abdomen Inspection Obese   Edema None   Technical Checks   Dialysis Machine No. 5   RO Machine Number 5   Dialyzer Lot No. [id number]   Tubing Lot Number [id number]   All Connections Secure Yes   NS Bag Yes   Saline Line Double Clamped Yes   Dialyzer Nipro ELISIO   Prime Volume (mL) 250 mL   ICEBOAT I;C;E;B;O;A;T   RO Machine Log Sheet Completed Yes   Machine Alarm Self Test Completed;Passed   Air Foam Detector Tested;Proper Function;pH Reading   Extracorporeal Circuit Tested for Integrity Yes   Machine Conductivity 13.7   Manual Ph 7.4   Bleach Test (Neg) Yes   Bath Temperature 96.8 °F (36 °C)   Dialysis Bath   K+ (Potassium) 3   Ca+ (Calcium) 2.5   Na+ (Sodium) 138   HCO3 (Bicarb) 38   Bicarbonate Concentrate Lot No. [id number]   Acid Concentrate Lot No. [id number]   Treatment Initiation   Dialyze Hours 3   Treatment  Initiation Universal Precautions maintained;Lines secured to patient;Connections secured;Prime given;Venous Parameters set;Arterial Parameters set;Air foam detector engaged;[place] Device;Dialysate;Saline line double clamped;Hemo-Safe Applied;Dialyzer   Hemodialysis Central Access - Temporary Right Neck   Placement Date/Time: 02/07/25 1636   Present on Admission/Arrival: No  Inserted by: Minna guzman  Insertion Practices: Chlorohexadine skin antisepsis;Hand hygiene;Maximal barrier precautions;Optimal catheter site selection;Sterile

## 2025-02-12 NOTE — CARE COORDINATION
Transition of Care Plan:  Patient accepted at [facility], however family gives preference for [facility].  Referral sent to [place].  Hep B Core AB results uploaded and sent via [facility] [facility].     Patient remains on cardene drip.  Nursing attempting to wean.   Patient to get perm cath placement today.    UR: 16%  Prior Level of Functioning: dependent (lives with family--parents).  Mom is legal guardian.  Disposition: home with outpatient HD  REJI: Thursday 2/13/25    Follow up appointments: PCP/Specialist  DME needed: none  Transportation at discharge: family  IM/IMM Medicare/ letter given:   1st letter 2/11/25    Caregiver Contact:   Discharge Caregiver contacted prior to discharge?   Care Conference needed?   Barriers to discharge:  medical stability    2:29pm  CM received update that outpatient HD start date will be 2/18/25 (which is next Tuesday).  CM called [facility] Admissions [phone] determine if patient could start HD on Sat 2/15.  Facilities don't start new HD's on Saturdays, therefore patient would need to remain in hospital until completion of HD (here) on Saturday.     Following completion of HD (here on Sat) patient could be d/c'd home.      CM notifying Dr Bearden, Dr Smith of update.    CM continuing to follow.    JOSE Gonzalez, CRM  [phone]   full range of motion in all extremities

## 2025-02-12 NOTE — PROGRESS NOTES
[address], [facility] [place] [address]  Phone: [phone]   [phone]    [email or web address]     Nephrology Progress Note    Patient Name : Ward Bhatia      : 1980     MRN : [id number]  Date of Admission : 2025  Date of Servive : 25    CC:  Follow up for ESRD       Assessment and Plan   New Onset ESRD :   - 2/2 Chronic crescentic Ig A nephropathy   - HD MWF  - PC for today  - family interested in PD in the future  - CM to work on outpt unit at [facility]    Hyperkalemia   Metabolic acidosis     Seizure Disorder :   - missed dilantin for 4 days PTA  - break through seizure   - Now on Keppra     Anemia in CKD  - JEROME MWF    HTN:  - cont present meds    Dysphagia:  - EGD neg on     RLS:  - will try low dose ropinirole tonight    Obesity   Anxiety          Interval History:  Seen and examined on dialysis.  Feeling ok overall.  For PC later today.  No cp, sob.  Mother c/o restless leg symptoms.    Review of Systems: Pertinent items are noted in HPI.    Current Medications:   Current Facility-Administered Medications   Medication Dose Route Frequency    niCARdipine (CARDENE) 20 mg in 0.86 % sodium chloride 200 mL infusion  2.5-15 mg/hr IntraVENous Continuous    metoprolol tartrate (LOPRESSOR) tablet 50 mg  50 mg Oral BID    albumin human 25% IV solution 25 g  25 g IntraVENous PRN    losartan (COZAAR) tablet 100 mg  100 mg Oral Daily    amLODIPine (NORVASC) tablet 10 mg  10 mg Oral Daily    amLODIPine (NORVASC) tablet 5 mg  5 mg Oral Once    losartan (COZAAR) tablet 50 mg  50 mg Oral Once    doxazosin (CARDURA) tablet 1 mg  1 mg Oral Daily    Epoetin Milton-epbx (RETACRIT) injection 20,000 Units  20,000 Units SubCUTAneous Once per day on     levETIRAcetam (KEPPRA) injection 500 mg  500 mg IntraVENous Once per day on     pantoprazole (PROTONIX) tablet 40 mg  40 mg Oral QAM AC    calcium

## 2025-02-12 NOTE — PLAN OF CARE
Speech LAnguage Pathology TREATMENT    Patient: Ward Bhatia (44 y.o. male)  Date: 2/12/2025  Primary Diagnosis: Hypocalcemia [E83.51]  Acute renal failure, unspecified acute renal failure type (HCC) [N17.9]  Acute kidney injury superimposed on CKD (HCC) [N17.9, N18.9]  Procedure(s) (LRB):  ESOPHAGOGASTRODUODENOSCOPY (N/A) 1 Day Post-Op   Precautions:                     ASSESSMENT :  Patient presents with grossly functional oropharyngeal swallow function at bedside characterized by no overt signs or symptoms aspiration with thin liquids. Patient limited to water for speech-language pathologist follow-up this date due to being NPO for procedure. Patient without regurgitation, however RN reports earlier this date he was noted with regurgitation after drinking water. Given clear chest imaging and presentation at bedside, I am inclined to think this issue has a low likelihood of being related to oropharyngeal dysphagia. Recommend defer to GI/attending for ongoing management of diet, with SLP available for assistance as needed.     Patient will benefit from skilled intervention to address the above impairments.     PLAN :  Recommendations and Planned Interventions:  Diet:  Regular and thin liquids when able to tolerate  - general aspiration precautions: upright for all oral intake, small bites/sips, slow rate of intake         Acute SLP Services: Yes, SLP will continue to follow per plan of care.  Discharge Recommendations: No, additional SLP treatment not indicated at discharge     SUBJECTIVE:   Patient stated, “Don't need to throw up.”    OBJECTIVE:     Past Medical History:   Diagnosis Date    Hemodialysis patient (HCC)     Hypertension     Seizure (HCC) 2025    last seizure during 1st dilaysis     Past Surgical History:   Procedure Laterality Date    CT BIOPSY RENAL  4/18/2024    CT BIOPSY RENAL 4/18/2024 [facility] RAD CT    IR INSERT NON TUNNELED CV CATH <5 YEARS  2/7/2025    IR INSERT NON TUNNELED CV CATH <5 YEARS  participate in ongoing swallow assessment to determine further dysphagia plan of care. Goal initiated 2/10.  Outcome: Progressing

## 2025-02-13 LAB
ALBUMIN SERPL-MCNC: 3 G/DL (ref 3.5–5)
ANION GAP SERPL CALC-SCNC: 18 MMOL/L (ref 2–12)
BUN SERPL-MCNC: 47 MG/DL (ref 6–20)
BUN/CREAT SERPL: 5 (ref 12–20)
CALCIUM SERPL-MCNC: 8.4 MG/DL (ref 8.5–10.1)
CHLORIDE SERPL-SCNC: 103 MMOL/L (ref 97–108)
CO2 SERPL-SCNC: 23 MMOL/L (ref 21–32)
CREAT SERPL-MCNC: 8.6 MG/DL (ref 0.7–1.3)
ERYTHROCYTE [DISTWIDTH] IN BLOOD BY AUTOMATED COUNT: 12.7 % (ref 11.5–14.5)
GLUCOSE SERPL-MCNC: 79 MG/DL (ref 65–100)
HCT VFR BLD AUTO: 24.7 % (ref 36.6–50.3)
HGB BLD-MCNC: 8.4 G/DL (ref 12.1–17)
MCH RBC QN AUTO: 27.9 PG (ref 26–34)
MCHC RBC AUTO-ENTMCNC: 34 G/DL (ref 30–36.5)
MCV RBC AUTO: 82.1 FL (ref 80–99)
NRBC # BLD: 0.03 K/UL (ref 0–0.01)
NRBC BLD-RTO: 0.3 PER 100 WBC
PHOSPHATE SERPL-MCNC: 6.6 MG/DL (ref 2.6–4.7)
PLATELET # BLD AUTO: 303 K/UL (ref 150–400)
PMV BLD AUTO: 9.5 FL (ref 8.9–12.9)
POTASSIUM SERPL-SCNC: 3.7 MMOL/L (ref 3.5–5.1)
RBC # BLD AUTO: 3.01 M/UL (ref 4.1–5.7)
SODIUM SERPL-SCNC: 144 MMOL/L (ref 136–145)
WBC # BLD AUTO: 9.2 K/UL (ref 4.1–11.1)

## 2025-02-13 PROCEDURE — 6360000002 HC RX W HCPCS: Performed by: HOSPITALIST

## 2025-02-13 PROCEDURE — 6360000002 HC RX W HCPCS: Performed by: NURSE PRACTITIONER

## 2025-02-13 PROCEDURE — 2580000003 HC RX 258: Performed by: HOSPITALIST

## 2025-02-13 PROCEDURE — 6360000002 HC RX W HCPCS: Performed by: INTERNAL MEDICINE

## 2025-02-13 PROCEDURE — 2500000003 HC RX 250 WO HCPCS: Performed by: HOSPITALIST

## 2025-02-13 PROCEDURE — 92526 ORAL FUNCTION THERAPY: CPT

## 2025-02-13 PROCEDURE — 80069 RENAL FUNCTION PANEL: CPT

## 2025-02-13 PROCEDURE — 6360000002 HC RX W HCPCS: Performed by: FAMILY MEDICINE

## 2025-02-13 PROCEDURE — 85027 COMPLETE CBC AUTOMATED: CPT

## 2025-02-13 PROCEDURE — 2500000003 HC RX 250 WO HCPCS: Performed by: FAMILY MEDICINE

## 2025-02-13 PROCEDURE — 6370000000 HC RX 637 (ALT 250 FOR IP): Performed by: INTERNAL MEDICINE

## 2025-02-13 PROCEDURE — 2060000000 HC ICU INTERMEDIATE R&B

## 2025-02-13 RX ORDER — CLONIDINE 0.1 MG/24H
1 PATCH, EXTENDED RELEASE TRANSDERMAL WEEKLY
Status: DISCONTINUED | OUTPATIENT
Start: 2025-02-13 | End: 2025-02-15 | Stop reason: HOSPADM

## 2025-02-13 RX ORDER — METOCLOPRAMIDE HYDROCHLORIDE 5 MG/ML
10 INJECTION INTRAMUSCULAR; INTRAVENOUS
Status: DISCONTINUED | OUTPATIENT
Start: 2025-02-13 | End: 2025-02-14

## 2025-02-13 RX ADMIN — ROPINIROLE HYDROCHLORIDE 0.25 MG: 0.25 TABLET, FILM COATED ORAL at 20:27

## 2025-02-13 RX ADMIN — METOPROLOL TARTRATE 2.5 MG: 1 INJECTION, SOLUTION INTRAVENOUS at 08:54

## 2025-02-13 RX ADMIN — PANTOPRAZOLE SODIUM 40 MG: 40 INJECTION, POWDER, FOR SOLUTION INTRAVENOUS at 08:55

## 2025-02-13 RX ADMIN — METOPROLOL TARTRATE 2.5 MG: 1 INJECTION, SOLUTION INTRAVENOUS at 01:55

## 2025-02-13 RX ADMIN — ONDANSETRON 4 MG: 2 INJECTION INTRAMUSCULAR; INTRAVENOUS at 20:27

## 2025-02-13 RX ADMIN — SODIUM CHLORIDE, PRESERVATIVE FREE 10 ML: 5 INJECTION INTRAVENOUS at 20:27

## 2025-02-13 RX ADMIN — SODIUM CHLORIDE, PRESERVATIVE FREE 10 ML: 5 INJECTION INTRAVENOUS at 08:56

## 2025-02-13 RX ADMIN — HYDRALAZINE HYDROCHLORIDE 20 MG: 20 INJECTION INTRAMUSCULAR; INTRAVENOUS at 13:37

## 2025-02-13 RX ADMIN — METOPROLOL TARTRATE 2.5 MG: 1 INJECTION, SOLUTION INTRAVENOUS at 20:26

## 2025-02-13 RX ADMIN — HEPARIN SODIUM 5000 UNITS: 5000 INJECTION INTRAVENOUS; SUBCUTANEOUS at 05:03

## 2025-02-13 RX ADMIN — HEPARIN SODIUM 5000 UNITS: 5000 INJECTION INTRAVENOUS; SUBCUTANEOUS at 20:27

## 2025-02-13 RX ADMIN — NICARDIPINE HYDROCHLORIDE 2.5 MG/HR: 0.1 INJECTION, SOLUTION INTRAVENOUS at 05:08

## 2025-02-13 RX ADMIN — HEPARIN SODIUM 5000 UNITS: 5000 INJECTION INTRAVENOUS; SUBCUTANEOUS at 13:38

## 2025-02-13 RX ADMIN — METOCLOPRAMIDE 10 MG: 5 INJECTION, SOLUTION INTRAMUSCULAR; INTRAVENOUS at 17:01

## 2025-02-13 RX ADMIN — LABETALOL HYDROCHLORIDE 15 MG: 5 INJECTION INTRAVENOUS at 05:03

## 2025-02-13 RX ADMIN — LABETALOL HYDROCHLORIDE 15 MG: 5 INJECTION INTRAVENOUS at 18:31

## 2025-02-13 RX ADMIN — ONDANSETRON 4 MG: 2 INJECTION INTRAMUSCULAR; INTRAVENOUS at 09:00

## 2025-02-13 RX ADMIN — METOPROLOL TARTRATE 2.5 MG: 1 INJECTION, SOLUTION INTRAVENOUS at 13:37

## 2025-02-13 RX ADMIN — LEVETIRACETAM 500 MG: 100 INJECTION INTRAVENOUS at 08:55

## 2025-02-13 ASSESSMENT — PAIN SCALES - GENERAL
PAINLEVEL_OUTOF10: 0
PAINLEVEL_OUTOF10: 0

## 2025-02-13 NOTE — PROGRESS NOTES
[facility]                                                                               Hospitalist Progress Note  Isabelle Cortes MD  Answering service: [phone] OR [phone] from in house phone        Date of Service:  2025  NAME:  Ward Bhatia  :  1980  MRN:  [id number]       Admission Summary:   Ward Bhatia is a 44 y.o. male with a pmhx IfA nephropathy who presents with poor appetite, and fatigue for the past week and is being admitted for PEMA on CKD.     In the ED, BP was elevated to 199/101.  Labs showed normocytic anemia with Hgb 9.3 (b/l 11.4), CO2 9, AG 21, creatinine 16.5, and Ca2+ 6.4.       In the ED, nephrology was consulted, and plan for initiation of HD      Interval history / Subjective:   Follow up PEMA  Now on dialysis started   S/p EGD   Cardene gtt ongoing  Awaiting PC; reschedule to tomorrow   Nausea, not tolerate diet yesterday    Family at bedside  Assessment & Plan:       #IGA nephropathy  #new onset ESRD  #anemia of CKD  -admitted for tunneled cath to start HD  -appreciate nephrology, CM to look for outpatient dialysis  - PC reschedule to tomorrow        #htn uncontrolled  -prn hydralazine, labetalol  -continue home norvasc, ARB, cardura. Add Metoprolol  -wean cardene gtt  today  -BP meds adjusted per renal      #seizure disorder: replaced home phenytoin with keppra as preferred for ESRD    #N/V  Dysphagia  -r/t new ESRD/HD?  -s/p EGD   Esophagus:hiatal hernia 3 cm in size   Rest of esophagus was normal   -short course ATC Reglan before meals , advance diet      Stomach: normal   Duodenum: normal    -Appreciate GI, PPI      Code status: Full Code No additional code details  Prophylaxis: Heparin ppx    Plan: Wean off cardene gtt as possible, CM to find outpatient HD chair, PC in am   Care Plan discussed with: Patient/Family and Nurse, wife updated bedside   Anticipated Disposition: Home  Anticipated Discharge:1-2 days  Admission  IntraVENous 2 times per day    sodium chloride flush 0.9 % injection 5-40 mL  5-40 mL IntraVENous PRN    0.9 % sodium chloride infusion   IntraVENous PRN    ondansetron (ZOFRAN-ODT) disintegrating tablet 4 mg  4 mg Oral Q8H PRN    Or    ondansetron (ZOFRAN) injection 4 mg  4 mg IntraVENous Q6H PRN    polyethylene glycol (GLYCOLAX) packet 17 g  17 g Oral Daily PRN    acetaminophen (TYLENOL) tablet 650 mg  650 mg Oral Q6H PRN    Or    acetaminophen (TYLENOL) suppository 650 mg  650 mg Rectal Q6H PRN     ______________________________________________________________________  EXPECTED LENGTH OF STAY: 7  ACTUAL LENGTH OF STAY:          6                 Isabelle Cortes MD

## 2025-02-13 NOTE — PLAN OF CARE
Speech LAnguage Pathology TREATMENT/DISCHARGE    Patient: Ward Bhatia (44 y.o. male)  Date: 2/13/2025  Primary Diagnosis: Hypocalcemia [E83.51]  Acute renal failure, unspecified acute renal failure type (HCC) [N17.9]  Acute kidney injury superimposed on CKD (HCC) [N17.9, N18.9]  Procedure(s) (LRB):  ESOPHAGOGASTRODUODENOSCOPY (N/A) 2 Days Post-Op   Precautions:                     ASSESSMENT :  Patient with no oral or pharyngeal dysphagia symptoms at bedside and normal EGD per GI report. Patient appears safe to consume regular textured solids and thin liquids however suspect acute onset feeding aversion possibly related to initiation of dialysis. Patient may benefit from modified diet full liquids to max PO intake to meet nutrition and hydration needs as patient can successfully consume thins and pureed solids without aversive behaviors this tx session. Will defer to MD for appropriate diet level at this time.    Patient will be discharged from [facility] skilled speech-language pathology services at this time.     PLAN :  Recommendations and Planned Interventions:  Diet: Regular and thin liquids  Consider full liquids as per patient preference  Straws ok  Esophageal/reflux precautions     Acute SLP Services: No, patient will be discharged from acute skilled speech-language pathology at this time.  Discharge Recommendations: Yes, recommend SLP treatment at next level of care for possible feeding aversion     SUBJECTIVE:   Patient awake, alert, receptive to clinician. Father in room and reports patient is a \"picky eater\" at home and was doing well with regular textured solids and thin liquids up until he started dialysis. Father reports patient with noted gagging and vomiting solids after dialysis started. Normal EGD on 2/11 per chart review.     OBJECTIVE:     Past Medical History:   Diagnosis Date    Hemodialysis patient ([facility])     Hypertension     Seizure ([facility]) 2025    last seizure during 1st dilaysis     Past

## 2025-02-13 NOTE — PLAN OF CARE
Problem: Discharge Planning  Goal: Discharge to home or other facility with appropriate resources  2/13/2025 1026 by Joanie Borrego RN  Outcome: Progressing  Flowsheets (Taken 2/13/2025 0800)  Discharge to home or other facility with appropriate resources: Identify barriers to discharge with patient and caregiver  2/12/2025 2225 by Addie Nix RN  Outcome: Progressing  Flowsheets (Taken 2/12/2025 2000)  Discharge to home or other facility with appropriate resources: Identify barriers to discharge with patient and caregiver     Problem: Pain  Goal: Verbalizes/displays adequate comfort level or baseline comfort level  2/13/2025 1026 by Joanie Borrego RN  Outcome: Progressing  2/12/2025 2225 by Addie Nix RN  Outcome: Progressing     Problem: Safety - Adult  Goal: Free from fall injury  2/13/2025 1026 by Joanie Borrego RN  Outcome: Progressing  2/12/2025 2225 by Addie Nix RN  Outcome: Progressing     Problem: Skin/Tissue Integrity  Goal: Skin integrity remains intact  Description: 1.  Monitor for areas of redness and/or skin breakdown  2.  Assess vascular access sites hourly  3.  Every 4-6 hours minimum:  Change oxygen saturation probe site  4.  Every 4-6 hours:  If on nasal continuous positive airway pressure, respiratory therapy assess nares and determine need for appliance change or resting period  2/13/2025 1026 by Joanie Borrego RN  Outcome: Progressing  Flowsheets  Taken 2/13/2025 1026  Skin Integrity Remains Intact: Monitor for areas of redness and/or skin breakdown  Taken 2/13/2025 0800  Skin Integrity Remains Intact: Monitor for areas of redness and/or skin breakdown  2/12/2025 2225 by Addie iNx RN  Outcome: Progressing  Flowsheets (Taken 2/12/2025 2000)  Skin Integrity Remains Intact: Monitor for areas of redness and/or skin breakdown     Problem: Nutrition Deficit:  Goal: Optimize nutritional status  2/13/2025 1026 by Joanie Borrego RN  Outcome:  April 15, 2022    Lee Tilley   [address]  [facility] [address]    RE: Baron Blackburn   MRN: [id number]  YOB: 1969   Lung Screening Exam Done On: 5/8/21    Dear Dr. Lee Tilley,    Your patient is over due for his annual low dose lung screening.     If you would like him to continue in the program, please complete the attached order and fax back at [phone] .    Thank you for using [facility], feel free to contact me with any questions.    Sincerely,      Lung Screening Program

## 2025-02-13 NOTE — PLAN OF CARE
Problem: Discharge Planning  Goal: Discharge to home or other facility with appropriate resources  Outcome: Progressing  Flowsheets (Taken 2/12/2025 2000)  Discharge to home or other facility with appropriate resources: Identify barriers to discharge with patient and caregiver     Problem: Pain  Goal: Verbalizes/displays adequate comfort level or baseline comfort level  Outcome: Progressing     Problem: Safety - Adult  Goal: Free from fall injury  Outcome: Progressing     Problem: SLP Adult - Impaired Swallowing  Goal: By Discharge: Advance to least restrictive diet without signs or symptoms of aspiration for planned discharge setting.  See evaluation for individualized goals.  Description: Speech Therapy Goals:  1. Patient will participate in ongoing swallow assessment to determine further dysphagia plan of care. Goal initiated 2/10.  2/12/2025 1415 by Addie Rodarte, SLP  Outcome: Progressing     Problem: Skin/Tissue Integrity  Goal: Skin integrity remains intact  Description: 1.  Monitor for areas of redness and/or skin breakdown  2.  Assess vascular access sites hourly  3.  Every 4-6 hours minimum:  Change oxygen saturation probe site  4.  Every 4-6 hours:  If on nasal continuous positive airway pressure, respiratory therapy assess nares and determine need for appliance change or resting period  Outcome: Progressing  Flowsheets (Taken 2/12/2025 2000)  Skin Integrity Remains Intact: Monitor for areas of redness and/or skin breakdown     Problem: Nutrition Deficit:  Goal: Optimize nutritional status  Outcome: Progressing

## 2025-02-13 NOTE — PROGRESS NOTES
Bedside shift change report given to KARIN Nair (oncoming nurse) by KARIN Real (offgoing nurse). Report included the following information Nurse Handoff Report.

## 2025-02-13 NOTE — CARE COORDINATION
Brief Note:  Patient's mom requested change in planned HD day schedule from T/TH/Sat to M/W/F.  CM sent request to [facility] and this site does not have availability at this time. Patient can be placed on a waiting list and can change days when slot becomes available.  Per Guardian (Rosemary) she will speak with Dialysis SW after patient's appt.    CM did check with [facility] and this HD site does not have M/W/F chair available..    Elisabet Guerrero, JOSE, CRM  [phone]

## 2025-02-13 NOTE — PROGRESS NOTES
[address], [facility] [place] [address]  Phone: [phone]   [phone]    [email or web address]     Nephrology Progress Note    Patient Name : Ward Bhatia      : 1980     MRN : [id number]  Date of Admission : 2025  Date of Servive : 25    CC:  Follow up for ESRD       Assessment and Plan   New Onset ESRD :   - 2/2 Chronic crescentic Ig A nephropathy   - HD MWF for now  - PC for today  - family interested in PD in the future  - CM to work on outpt unit at [facility]    Hyperkalemia   Metabolic acidosis     Seizure Disorder :   - missed dilantin for 4 days PTA  - break through seizure   - Now on Keppra     Anemia in CKD  - JEROME MWF    HTN:  - stop cardene drip  - start clonidine patch  - encourage po intake as able    Dysphagia:  - EGD neg on     RLS:  - cont with low dose ropinirole at bedtime    Obesity   Anxiety          Interval History:  Seen and examined. Feeling better. RLS symptoms improving.  Did not get PC yesterday, will get it today.  Still w/ nausea, vomiting.  Unable to keep po meds down.  On cardene drip.     Review of Systems: Pertinent items are noted in HPI.    Current Medications:   Current Facility-Administered Medications   Medication Dose Route Frequency    cloNIDine (CATAPRES) 0.1 MG/24HR 1 patch  1 patch TransDERmal Weekly    niCARdipine (CARDENE) 20 mg in 0.86 % sodium chloride 200 mL infusion  2.5-15 mg/hr IntraVENous Continuous    rOPINIRole (REQUIP) tablet 0.25 mg  0.25 mg Oral Nightly    pantoprazole (PROTONIX) 40 mg in sodium chloride (PF) 0.9 % 10 mL injection  40 mg IntraVENous Daily    metoprolol (LOPRESSOR) injection 2.5 mg  2.5 mg IntraVENous Q6H    [Held by provider] metoprolol tartrate (LOPRESSOR) tablet 50 mg  50 mg Oral BID    albumin human 25% IV solution 25 g  25 g IntraVENous PRN    losartan (COZAAR) tablet 100 mg  100 mg Oral Daily    amLODIPine (NORVASC) tablet 10 mg  10 mg Oral

## 2025-02-13 NOTE — PROGRESS NOTES
1030: Angio unable to place permacath today. Per IR nurse, provider would like to wait until pt is off cardene gtt. Dr. Cortes made aware.

## 2025-02-14 ENCOUNTER — HOSPITAL ENCOUNTER (INPATIENT)
Facility: HOSPITAL | Age: 45
Discharge: HOME | End: 2025-02-17
Attending: INTERNAL MEDICINE
Payer: MEDICARE

## 2025-02-14 VITALS
OXYGEN SATURATION: 92 % | HEART RATE: 108 BPM | TEMPERATURE: 97.8 F | RESPIRATION RATE: 22 BRPM | DIASTOLIC BLOOD PRESSURE: 102 MMHG | SYSTOLIC BLOOD PRESSURE: 160 MMHG

## 2025-02-14 LAB
ALBUMIN SERPL-MCNC: 3 G/DL (ref 3.5–5)
ANION GAP SERPL CALC-SCNC: 12 MMOL/L (ref 2–12)
BUN SERPL-MCNC: 63 MG/DL (ref 6–20)
BUN/CREAT SERPL: 6 (ref 12–20)
CALCIUM SERPL-MCNC: 8.3 MG/DL (ref 8.5–10.1)
CHLORIDE SERPL-SCNC: 103 MMOL/L (ref 97–108)
CO2 SERPL-SCNC: 27 MMOL/L (ref 21–32)
CREAT SERPL-MCNC: 10.6 MG/DL (ref 0.7–1.3)
ERYTHROCYTE [DISTWIDTH] IN BLOOD BY AUTOMATED COUNT: 12.8 % (ref 11.5–14.5)
GLUCOSE SERPL-MCNC: 98 MG/DL (ref 65–100)
HCT VFR BLD AUTO: 24.3 % (ref 36.6–50.3)
HGB BLD-MCNC: 8 G/DL (ref 12.1–17)
MCH RBC QN AUTO: 27.4 PG (ref 26–34)
MCHC RBC AUTO-ENTMCNC: 32.9 G/DL (ref 30–36.5)
MCV RBC AUTO: 83.2 FL (ref 80–99)
NRBC # BLD: 0.06 K/UL (ref 0–0.01)
NRBC BLD-RTO: 0.7 PER 100 WBC
PHOSPHATE SERPL-MCNC: 6.5 MG/DL (ref 2.6–4.7)
PLATELET # BLD AUTO: 357 K/UL (ref 150–400)
PMV BLD AUTO: 10 FL (ref 8.9–12.9)
POTASSIUM SERPL-SCNC: 3.8 MMOL/L (ref 3.5–5.1)
RBC # BLD AUTO: 2.92 M/UL (ref 4.1–5.7)
SODIUM SERPL-SCNC: 142 MMOL/L (ref 136–145)
WBC # BLD AUTO: 9 K/UL (ref 4.1–11.1)

## 2025-02-14 PROCEDURE — 6360000002 HC RX W HCPCS: Performed by: HOSPITALIST

## 2025-02-14 PROCEDURE — 2500000003 HC RX 250 WO HCPCS: Performed by: FAMILY MEDICINE

## 2025-02-14 PROCEDURE — 6360000002 HC RX W HCPCS: Performed by: INTERNAL MEDICINE

## 2025-02-14 PROCEDURE — 2709999900 IR INSERT TUNNELED CV CATH WO SQ PORT/PUMP < 5YRS

## 2025-02-14 PROCEDURE — 6360000002 HC RX W HCPCS

## 2025-02-14 PROCEDURE — 6370000000 HC RX 637 (ALT 250 FOR IP): Performed by: INTERNAL MEDICINE

## 2025-02-14 PROCEDURE — 90935 HEMODIALYSIS ONE EVALUATION: CPT

## 2025-02-14 PROCEDURE — 02H633Z INSERTION OF INFUSION DEVICE INTO RIGHT ATRIUM, PERCUTANEOUS APPROACH: ICD-10-PCS | Performed by: STUDENT IN AN ORGANIZED HEALTH CARE EDUCATION/TRAINING PROGRAM

## 2025-02-14 PROCEDURE — 6370000000 HC RX 637 (ALT 250 FOR IP): Performed by: HOSPITALIST

## 2025-02-14 PROCEDURE — 2580000003 HC RX 258: Performed by: HOSPITALIST

## 2025-02-14 PROCEDURE — 80069 RENAL FUNCTION PANEL: CPT

## 2025-02-14 PROCEDURE — 2500000003 HC RX 250 WO HCPCS: Performed by: HOSPITALIST

## 2025-02-14 PROCEDURE — 85027 COMPLETE CBC AUTOMATED: CPT

## 2025-02-14 PROCEDURE — 2060000000 HC ICU INTERMEDIATE R&B

## 2025-02-14 PROCEDURE — 0JH63XZ INSERTION OF TUNNELED VASCULAR ACCESS DEVICE INTO CHEST SUBCUTANEOUS TISSUE AND FASCIA, PERCUTANEOUS APPROACH: ICD-10-PCS | Performed by: STUDENT IN AN ORGANIZED HEALTH CARE EDUCATION/TRAINING PROGRAM

## 2025-02-14 PROCEDURE — 2500000003 HC RX 250 WO HCPCS

## 2025-02-14 RX ORDER — PANTOPRAZOLE SODIUM 40 MG/1
40 TABLET, DELAYED RELEASE ORAL
Status: DISCONTINUED | OUTPATIENT
Start: 2025-02-15 | End: 2025-02-15 | Stop reason: HOSPADM

## 2025-02-14 RX ORDER — METOCLOPRAMIDE 10 MG/1
5 TABLET ORAL
Status: DISCONTINUED | OUTPATIENT
Start: 2025-02-14 | End: 2025-02-15 | Stop reason: HOSPADM

## 2025-02-14 RX ORDER — FENTANYL CITRATE 50 UG/ML
INJECTION, SOLUTION INTRAMUSCULAR; INTRAVENOUS PRN
Status: COMPLETED | OUTPATIENT
Start: 2025-02-14 | End: 2025-02-14

## 2025-02-14 RX ADMIN — LEVETIRACETAM 500 MG: 100 INJECTION INTRAVENOUS at 21:10

## 2025-02-14 RX ADMIN — HEPARIN SODIUM 1300 UNITS: 1000 INJECTION INTRAVENOUS; SUBCUTANEOUS at 12:04

## 2025-02-14 RX ADMIN — LEVETIRACETAM 500 MG: 100 INJECTION INTRAVENOUS at 08:10

## 2025-02-14 RX ADMIN — EPOETIN ALFA-EPBX 20000 UNITS: 20000 INJECTION, SOLUTION INTRAVENOUS; SUBCUTANEOUS at 15:57

## 2025-02-14 RX ADMIN — AMLODIPINE BESYLATE 10 MG: 5 TABLET ORAL at 08:10

## 2025-02-14 RX ADMIN — FENTANYL CITRATE 50 MCG: 50 INJECTION INTRAMUSCULAR; INTRAVENOUS at 14:59

## 2025-02-14 RX ADMIN — METOPROLOL TARTRATE 2.5 MG: 1 INJECTION, SOLUTION INTRAVENOUS at 08:11

## 2025-02-14 RX ADMIN — WATER 2000 MG: 1 INJECTION INTRAMUSCULAR; INTRAVENOUS; SUBCUTANEOUS at 14:51

## 2025-02-14 RX ADMIN — LOSARTAN POTASSIUM 100 MG: 50 TABLET, FILM COATED ORAL at 08:11

## 2025-02-14 RX ADMIN — METOCLOPRAMIDE 10 MG: 5 INJECTION, SOLUTION INTRAMUSCULAR; INTRAVENOUS at 06:42

## 2025-02-14 RX ADMIN — METOPROLOL TARTRATE 50 MG: 50 TABLET, FILM COATED ORAL at 21:10

## 2025-02-14 RX ADMIN — SODIUM CHLORIDE, PRESERVATIVE FREE 10 ML: 5 INJECTION INTRAVENOUS at 21:11

## 2025-02-14 RX ADMIN — DOXAZOSIN 1 MG: 2 TABLET ORAL at 08:11

## 2025-02-14 RX ADMIN — SODIUM CHLORIDE, PRESERVATIVE FREE 10 ML: 5 INJECTION INTRAVENOUS at 08:11

## 2025-02-14 RX ADMIN — METOCLOPRAMIDE 5 MG: 10 TABLET ORAL at 15:57

## 2025-02-14 RX ADMIN — PANTOPRAZOLE SODIUM 40 MG: 40 INJECTION, POWDER, FOR SOLUTION INTRAVENOUS at 08:09

## 2025-02-14 RX ADMIN — METOPROLOL TARTRATE 2.5 MG: 1 INJECTION, SOLUTION INTRAVENOUS at 03:07

## 2025-02-14 RX ADMIN — HEPARIN SODIUM 5000 UNITS: 5000 INJECTION INTRAVENOUS; SUBCUTANEOUS at 21:09

## 2025-02-14 RX ADMIN — HEPARIN SODIUM 5000 UNITS: 5000 INJECTION INTRAVENOUS; SUBCUTANEOUS at 15:57

## 2025-02-14 RX ADMIN — HEPARIN SODIUM 5000 UNITS: 5000 INJECTION INTRAVENOUS; SUBCUTANEOUS at 06:42

## 2025-02-14 RX ADMIN — ROPINIROLE HYDROCHLORIDE 0.25 MG: 0.25 TABLET, FILM COATED ORAL at 21:10

## 2025-02-14 ASSESSMENT — PAIN SCALES - GENERAL: PAINLEVEL_OUTOF10: 0

## 2025-02-14 NOTE — PROGRESS NOTES
TRANSFER - OUT REPORT:    Verbal report given to Harriet on Ward Bhatia  being transferred to [facility] for routine progression of patient care       Report consisted of patient's Situation, Background, Assessment and   Recommendations(SBAR).     Information from the following report(s) overview of case, meds, and vital signs was reviewed with the receiving nurse.           Lines:   Peripheral IV 02/11/25 Distal;Right Forearm (Active)   Site Assessment Painful 02/13/25 2000   Line Status Infusing;Flushed 02/13/25 1600   Line Care Connections checked and tightened;Cap changed 02/13/25 1600   Phlebitis Assessment No symptoms 02/13/25 1600   Infiltration Assessment 0 02/13/25 1600   Alcohol Cap Used Yes 02/13/25 1600   Dressing Status Clean, dry & intact 02/13/25 1600   Dressing Type Transparent 02/13/25 1600       Peripheral IV 02/12/25 Left Forearm (Active)   Site Assessment Clean, dry & intact 02/14/25 0800   Line Status Flushed;Infusing 02/14/25 0800   Line Care Connections checked and tightened 02/14/25 0800   Phlebitis Assessment No symptoms 02/14/25 0800   Infiltration Assessment 0 02/14/25 0800   Alcohol Cap Used Yes 02/14/25 0800   Dressing Status Clean, dry & intact 02/14/25 0800   Dressing Type Transparent 02/14/25 0800       Hemodialysis Central Access - Permanent/Tunneled Right Neck (Active)        Opportunity for questions and clarification was provided.      Patient transported with:  Tech

## 2025-02-14 NOTE — PROGRESS NOTES
[facility]                                                                               Hospitalist Progress Note  Isabelle Cortes MD  Answering service: [phone] OR [phone] from in house phone        Date of Service:  2025  NAME:  Ward Bhatia  :  1980  MRN:  [id number]       Admission Summary:   Ward Bhatia is a 44 y.o. male with a pmhx IfA nephropathy who presents with poor appetite, and fatigue for the past week and is being admitted for PEMA on CKD.     In the ED, BP was elevated to 199/101.  Labs showed normocytic anemia with Hgb 9.3 (b/l 11.4), CO2 9, AG 21, creatinine 16.5, and Ca2+ 6.4.       In the ED, nephrology was consulted, and plan for initiation of HD      Interval history / Subjective:   Follow up PEMA  Now on dialysis started   S/p EGD   Tolerate diet , no more vomiting   PC today  HD today      Assessment & Plan:       #IGA nephropathy  #new onset ESRD  #anemia of CKD  -admitted for tunneled cath to start HD  -appreciate nephrology, CM to look for outpatient dialysis  - PC today. HD today.  Outpatient HD spot [facility], 1st outpatient will be Tuesday. D/w renal  Will have short HD tomorrow Sat, then can dc home        #htn uncontrolled  -prn hydralazine, labetalol  -continue home norvasc, ARB, cardura. Add Metoprolol  -wean cardene gtt  dc    -BP meds adjusted per renal      #seizure disorder: replaced home phenytoin with keppra as preferred for ESRD    #N/V  Dysphagia  -r/t new ESRD/HD?  -s/p EGD   Esophagus:hiatal hernia 3 cm in size   Rest of esophagus was normal   -short course ATC Reglan before meals , advance diet        Stomach: normal   Duodenum: normal    -Appreciate GI, PPI      Code status: Full Code No additional code details  Prophylaxis: Heparin ppx    Plan: HD again tomorrow   Care Plan discussed with: Patient/Family and Nurse, mother updated bedside   Anticipated Disposition: Home  Anticipated Discharge:tomorrow.

## 2025-02-14 NOTE — PLAN OF CARE
Problem: Discharge Planning  Goal: Discharge to home or other facility with appropriate resources  2/13/2025 2104 by Addie Nix RN  Outcome: Progressing  Flowsheets (Taken 2/13/2025 2000)  Discharge to home or other facility with appropriate resources: Identify barriers to discharge with patient and caregiver  2/13/2025 1026 by Joanie Borrego RN  Outcome: Progressing  Flowsheets (Taken 2/13/2025 0800)  Discharge to home or other facility with appropriate resources: Identify barriers to discharge with patient and caregiver     Problem: Pain  Goal: Verbalizes/displays adequate comfort level or baseline comfort level  2/13/2025 2104 by Addie Nix RN  Outcome: Progressing  Flowsheets  Taken 2/13/2025 1600 by Joanie Borrego RN  Verbalizes/displays adequate comfort level or baseline comfort level: Encourage patient to monitor pain and request assistance  Taken 2/13/2025 1200 by Joanie Borrego RN  Verbalizes/displays adequate comfort level or baseline comfort level: Encourage patient to monitor pain and request assistance  2/13/2025 1026 by Joanie Borrego RN  Outcome: Progressing     Problem: Safety - Adult  Goal: Free from fall injury  2/13/2025 2104 by Addie Nix RN  Outcome: Progressing  2/13/2025 1026 by Joanie Borrego RN  Outcome: Progressing     Problem: Skin/Tissue Integrity  Goal: Skin integrity remains intact  Description: 1.  Monitor for areas of redness and/or skin breakdown  2.  Assess vascular access sites hourly  3.  Every 4-6 hours minimum:  Change oxygen saturation probe site  4.  Every 4-6 hours:  If on nasal continuous positive airway pressure, respiratory therapy assess nares and determine need for appliance change or resting period  2/13/2025 2104 by Addie Nix RN  Outcome: Progressing  Flowsheets (Taken 2/13/2025 2000)  Skin Integrity Remains Intact: Monitor for areas of redness and/or skin breakdown  2/13/2025 1026 by Joanie Borrego RN  Outcome:

## 2025-02-14 NOTE — PROGRESS NOTES
Report called to Harriet FRAZIER 4th floor tele.  RN updated w/ pat status, vs and medications provided during procedure.

## 2025-02-14 NOTE — FLOWSHEET NOTE
Signs   BP (!) 168/110   Pulse (!) 103   Pain Assessment   Pain Assessment None - Denies Pain   Technical Checks   Dialysis Machine No. [id number]   RO Machine Number [id number]   Dialyzer Lot No. [id number]   Tubing Lot Number [id number]   All Connections Secure Yes   NS Bag Yes   Saline Line Double Clamped Yes   Dialyzer Nipro ELISIO   Prime Volume (mL) 200 mL   ICEBOAT I;C;E;B;O;A;T   RO Machine Log Sheet Completed Yes   Machine Alarm Self Test Completed;Passed   Air Foam Detector Tested;Proper Function;pH Reading   Extracorporeal Circuit Tested for Integrity Yes   Machine Conductivity 13.9   Manual Ph 7.4   Bleach Test (Neg) Yes   Bath Temperature 96.8 °F (36 °C)   Treatment Initiation   Dialyze Hours 3   Treatment  Initiation Universal Precautions maintained;Lines secured to patient;Connections secured;Prime given;Venous Parameters set;Arterial Parameters set;Air foam detector engaged;Saline line double clamped   Dialysis Bath   K+ (Potassium) 3   Ca+ (Calcium) 2.5   Na+ (Sodium) 138   HCO3 (Bicarb) 38   Bicarbonate Concentrate Lot No. [id number]   Acid Concentrate Lot No. [id number]         Treatment Start     02/14/25 0910   Treatment   Time On 0910   Treatment Goal 2L   Vital Signs   BP (!) 155/95   Pulse (!) 101   During Hemodialysis Assessment   Blood Flow Rate (ml/min) 300 ml/min   Arterial Pressure (mmHg) -90 mmHg   Venous Pressure (mmHg) 60   TMP 80      Comments Treatment started   Access Visible Yes   Ultrafiltration Rate (ml/hr) 900 ml/hr   Ultrafiltration Removed (ml) 0 ml   Hemodialysis Central Access - Temporary Right Neck   Placement Date/Time: 02/07/25 1636   Present on Admission/Arrival: No  Inserted by: Minna guzman  Insertion Practices: Chlorohexadine skin antisepsis;Hand hygiene;Maximal barrier precautions;Optimal catheter site selection;Sterile ultrasound technique...   Continued need for line? Yes   Site Assessment Clean, dry & intact   Blue Lumen Status Brisk blood return;Flushed;Infusing

## 2025-02-14 NOTE — PLAN OF CARE
Problem: Discharge Planning  Goal: Discharge to home or other facility with appropriate resources  Outcome: Progressing  Flowsheets (Taken 2/14/2025 0800)  Discharge to home or other facility with appropriate resources: Identify barriers to discharge with patient and caregiver     Problem: Pain  Goal: Verbalizes/displays adequate comfort level or baseline comfort level  Outcome: Progressing  Flowsheets (Taken 2/14/2025 0800)  Verbalizes/displays adequate comfort level or baseline comfort level: Encourage patient to monitor pain and request assistance     Problem: Safety - Adult  Goal: Free from fall injury  Outcome: Progressing     Problem: Skin/Tissue Integrity  Goal: Skin integrity remains intact  Description: 1.  Monitor for areas of redness and/or skin breakdown  2.  Assess vascular access sites hourly  3.  Every 4-6 hours minimum:  Change oxygen saturation probe site  4.  Every 4-6 hours:  If on nasal continuous positive airway pressure, respiratory therapy assess nares and determine need for appliance change or resting period  Outcome: Progressing  Flowsheets  Taken 2/14/2025 1137  Skin Integrity Remains Intact: Monitor for areas of redness and/or skin breakdown  Taken 2/14/2025 0800  Skin Integrity Remains Intact: Monitor for areas of redness and/or skin breakdown     Problem: Nutrition Deficit:  Goal: Optimize nutritional status  Outcome: Progressing

## 2025-02-14 NOTE — PROGRESS NOTES
[address], [facility] [place] [address]  Phone: [phone]   [phone]    [email or web address]     Nephrology Progress Note    Patient Name : Ward Bhatia      : 1980     MRN : [id number]  Date of Admission : 2025  Date of Servive : 25    CC:  Follow up for ESRD       Assessment and Plan   New Onset ESRD :   - 2/2 Chronic crescentic Ig A nephropathy   - HD MWF for now  - PC for today  - family interested in PD in the future  - CM to work on outpt unit at [facility] but for now TTS is only available; if no spot for MWF, will do HD heidi then can start next Tues at outpt HD    Hyperkalemia - resolved  Metabolic acidosis - resolved     Seizure Disorder :   - missed dilantin for 4 days PTA  - break through seizure   - Now on Keppra     Anemia in CKD  - JEROME MWF    HTN:  - stop cardene drip  - started clonidine patch, po Losartan, Norvasc    Dysphagia:  - EGD neg on ; consider gastric emptying study    RLS:  - low dose ropinirole qHS    Obesity   Anxiety        Interval History:  Seen and examined. Seen getting HD tolerating HD. Did not get PC yesterday, will get it today. Some difficulty swallowing solid food, liq is fine. S/p cardene drip.     Review of Systems: Pertinent items are noted in HPI.    Current Medications:   Current Facility-Administered Medications   Medication Dose Route Frequency    cloNIDine (CATAPRES) 0.1 MG/24HR 1 patch  1 patch TransDERmal Weekly    metoclopramide (REGLAN) injection 10 mg  10 mg IntraVENous TID AC    niCARdipine (CARDENE) 20 mg in 0.86 % sodium chloride 200 mL infusion  2.5-15 mg/hr IntraVENous Continuous    rOPINIRole (REQUIP) tablet 0.25 mg  0.25 mg Oral Nightly    pantoprazole (PROTONIX) 40 mg in sodium chloride (PF) 0.9 % 10 mL injection  40 mg IntraVENous Daily    metoprolol (LOPRESSOR) injection 2.5 mg  2.5 mg IntraVENous Q6H    [Held by provider] metoprolol tartrate (LOPRESSOR)

## 2025-02-14 NOTE — PROGRESS NOTES
INTERVENTIONAL RADIOLOGY  Preoperative History and Physical      Patient:  Ward Bhatia  :  1980  Age:  44 y.o.  MRN:  [id number]  Today's Date:  2025      CC / HPI   Ward Bhatia is a 44 y.o. male with a history of renal failure who presents for Permcath placement.    PAST MEDICAL HISTORY  Past Medical History:   Diagnosis Date    Hemodialysis patient (HCC)     Hypertension     Seizure (HCC)     last seizure during 1st dilaysis     PAST SURGICAL HISTORY  Past Surgical History:   Procedure Laterality Date    CT BIOPSY RENAL  2024    CT BIOPSY RENAL 2024 [facility] RAD CT    IR INSERT NON TUNNELED CV CATH <5 YEARS  2025    IR INSERT NON TUNNELED CV CATH <5 YEARS 2025 [facility] RAD ANGIO IR    UPPER GASTROINTESTINAL ENDOSCOPY N/A 2025    ESOPHAGOGASTRODUODENOSCOPY performed by Jose G Casarez MD at [facility] ENDOSCOPY     SOCIAL HISTORY  Social History     Socioeconomic History    Marital status: Single     Spouse name: Not on file    Number of children: Not on file    Years of education: Not on file    Highest education level: Not on file   Occupational History    Not on file   Tobacco Use    Smoking status: Never    Smokeless tobacco: Never   Substance and Sexual Activity    Alcohol use: Never    Drug use: Never    Sexual activity: Not on file   Other Topics Concern    Not on file   Social History Narrative    Not on file     Social Determinants of Health     Financial Resource Strain: Not on file   Food Insecurity: No Food Insecurity (2025)    Hunger Vital Sign     Worried About Running Out of Food in the Last Year: Never true     Ran Out of Food in the Last Year: Never true   Transportation Needs: No Transportation Needs (2025)    PRAPARE - Transportation     Lack of Transportation (Medical): No     Lack of Transportation (Non-Medical): No   Physical Activity: Not on file   Stress: Not on file   Social Connections: Not on file   Intimate Partner Violence: Not on file   Housing  357 02/14/2025 06:33 AM    MCV 83.2 02/14/2025 06:33 AM     Lab Results   Component Value Date/Time     02/14/2025 06:33 AM    K 3.8 02/14/2025 06:33 AM     02/14/2025 06:33 AM    CO2 27 02/14/2025 06:33 AM    BUN 63 02/14/2025 06:33 AM     Lab Results   Component Value Date/Time    INR 1.0 04/18/2024 09:04 AM       PHYSICAL EXAM   BP (!) 146/90   Pulse (!) 110   Temp 97.8 °F (36.6 °C) (Temporal)   Resp 11   SpO2 94%    General:  NAD  Heart:  RRR  Lungs:  NWOB  Neurological:  AAOX3      ASSESSMENT / PLAN   Procedure to be performed:  fluoro guided Permcath placement  Plan for sedation:  moderate  Mallampati Airway Assessment:  II (soft palate, uvula, fauces visible)  ASA Classification:  ASA 3 - Patient with moderate systemic disease with functional limitations  Post procedure plan:  observation per protocol  Informed consent:  indication, risks and alternatives of the planned procedure and sedation methods reviewed with the patient / family who agree to proceed  Code status:  Full      Case reviewed with Dr. Harsha Carrera who is in agreement with the assessment and plan.      TG KAT JR, APRN - NP  [facility]

## 2025-02-15 VITALS
DIASTOLIC BLOOD PRESSURE: 52 MMHG | SYSTOLIC BLOOD PRESSURE: 100 MMHG | RESPIRATION RATE: 16 BRPM | BODY MASS INDEX: 37.06 KG/M2 | WEIGHT: 236.11 LBS | TEMPERATURE: 98.3 F | HEIGHT: 67 IN | OXYGEN SATURATION: 94 % | HEART RATE: 94 BPM

## 2025-02-15 PROCEDURE — 90935 HEMODIALYSIS ONE EVALUATION: CPT

## 2025-02-15 PROCEDURE — 2500000003 HC RX 250 WO HCPCS: Performed by: FAMILY MEDICINE

## 2025-02-15 PROCEDURE — P9047 ALBUMIN (HUMAN), 25%, 50ML: HCPCS | Performed by: INTERNAL MEDICINE

## 2025-02-15 PROCEDURE — 6370000000 HC RX 637 (ALT 250 FOR IP): Performed by: HOSPITALIST

## 2025-02-15 PROCEDURE — 6360000002 HC RX W HCPCS: Performed by: INTERNAL MEDICINE

## 2025-02-15 RX ORDER — METOPROLOL TARTRATE 50 MG
50 TABLET ORAL 2 TIMES DAILY
Qty: 60 TABLET | Refills: 3 | Status: SHIPPED | OUTPATIENT
Start: 2025-02-15

## 2025-02-15 RX ORDER — DOXAZOSIN 1 MG/1
1 TABLET ORAL DAILY
Qty: 30 TABLET | Refills: 3 | Status: SHIPPED | OUTPATIENT
Start: 2025-02-16

## 2025-02-15 RX ORDER — LOSARTAN POTASSIUM 100 MG/1
100 TABLET ORAL DAILY
Qty: 30 TABLET | Refills: 3 | Status: SHIPPED | OUTPATIENT
Start: 2025-02-16

## 2025-02-15 RX ORDER — LEVETIRACETAM 500 MG/1
500 TABLET ORAL DAILY
Qty: 90 TABLET | Refills: 3 | Status: SHIPPED | OUTPATIENT
Start: 2025-02-15

## 2025-02-15 RX ORDER — ONDANSETRON 4 MG/1
4 TABLET, ORALLY DISINTEGRATING ORAL EVERY 8 HOURS PRN
Qty: 60 TABLET | Refills: 0 | Status: SHIPPED | OUTPATIENT
Start: 2025-02-15

## 2025-02-15 RX ORDER — PANTOPRAZOLE SODIUM 40 MG/1
40 TABLET, DELAYED RELEASE ORAL
Qty: 30 TABLET | Refills: 3 | Status: SHIPPED | OUTPATIENT
Start: 2025-02-16

## 2025-02-15 RX ORDER — ROPINIROLE 0.25 MG/1
0.25 TABLET, FILM COATED ORAL NIGHTLY
Qty: 90 TABLET | Refills: 3 | Status: SHIPPED | OUTPATIENT
Start: 2025-02-15

## 2025-02-15 RX ORDER — AMLODIPINE BESYLATE 10 MG/1
10 TABLET ORAL DAILY
Qty: 30 TABLET | Refills: 3 | Status: SHIPPED | OUTPATIENT
Start: 2025-02-16

## 2025-02-15 RX ORDER — CLONIDINE 0.1 MG/24H
1 PATCH, EXTENDED RELEASE TRANSDERMAL WEEKLY
Qty: 4 PATCH | Refills: 3 | Status: SHIPPED | OUTPATIENT
Start: 2025-02-20

## 2025-02-15 RX ADMIN — SODIUM CHLORIDE, PRESERVATIVE FREE 10 ML: 5 INJECTION INTRAVENOUS at 08:51

## 2025-02-15 RX ADMIN — LEVETIRACETAM 500 MG: 100 INJECTION INTRAVENOUS at 08:49

## 2025-02-15 RX ADMIN — HEPARIN SODIUM 5000 UNITS: 5000 INJECTION INTRAVENOUS; SUBCUTANEOUS at 06:36

## 2025-02-15 RX ADMIN — PANTOPRAZOLE SODIUM 40 MG: 40 TABLET, DELAYED RELEASE ORAL at 06:36

## 2025-02-15 RX ADMIN — DOXAZOSIN 1 MG: 2 TABLET ORAL at 08:50

## 2025-02-15 RX ADMIN — METOPROLOL TARTRATE 50 MG: 50 TABLET, FILM COATED ORAL at 08:50

## 2025-02-15 RX ADMIN — HEPARIN SODIUM 1300 UNITS: 1000 INJECTION INTRAVENOUS; SUBCUTANEOUS at 12:27

## 2025-02-15 RX ADMIN — METOCLOPRAMIDE 5 MG: 10 TABLET ORAL at 06:36

## 2025-02-15 RX ADMIN — ALBUMIN (HUMAN) 25 G: 0.25 INJECTION, SOLUTION INTRAVENOUS at 12:27

## 2025-02-15 NOTE — PLAN OF CARE
Problem: Discharge Planning  Goal: Discharge to home or other facility with appropriate resources  2/14/2025 2321 by Yamila Hong RN  Outcome: Progressing  Flowsheets (Taken 2/14/2025 2321)  Discharge to home or other facility with appropriate resources:   Identify barriers to discharge with patient and caregiver   Identify discharge learning needs (meds, wound care, etc)   Arrange for needed discharge resources and transportation as appropriate   Refer to discharge planning if patient needs post-hospital services based on physician order or complex needs related to functional status, cognitive ability or social support system     Problem: Pain  Goal: Verbalizes/displays adequate comfort level or baseline comfort level  2/14/2025 2321 by Yamila Hong RN  Outcome: Progressing  Flowsheets  Taken 2/14/2025 2321 by Yamila Hong RN  Verbalizes/displays adequate comfort level or baseline comfort level:   Encourage patient to monitor pain and request assistance   Assess pain using appropriate pain scale   Administer analgesics based on type and severity of pain and evaluate response    Problem: Safety - Adult  Goal: Free from fall injury  2/14/2025 2321 by Yamila Hong RN  Outcome: Progressing  Flowsheets (Taken 2/14/2025 2321)  Free From Fall Injury: Instruct family/caregiver on patient safety     Problem: Skin/Tissue Integrity  Goal: Skin integrity remains intact  Description: 1.  Monitor for areas of redness and/or skin breakdown  2.  Assess vascular access sites hourly  3.  Every 4-6 hours minimum:  Change oxygen saturation probe site  4.  Every 4-6 hours:  If on nasal continuous positive airway pressure, respiratory therapy assess nares and determine need for appliance change or resting period  2/14/2025 2321 by Yamila Hong RN  Outcome: Progressing  Flowsheets  Taken 2/14/2025 2321  Skin Integrity Remains Intact: Monitor for

## 2025-02-15 NOTE — PLAN OF CARE
Encourage patient to monitor pain and request assistance     Problem: Safety - Adult  Goal: Free from fall injury  2/15/2025 1039 by Joanie Borrego RN  Outcome: Progressing  2/14/2025 2321 by Yamila Hong RN  Outcome: Progressing  Flowsheets (Taken 2/14/2025 2321)  Free From Fall Injury: Instruct family/caregiver on patient safety     Problem: Skin/Tissue Integrity  Goal: Skin integrity remains intact  Description: 1.  Monitor for areas of redness and/or skin breakdown  2.  Assess vascular access sites hourly  3.  Every 4-6 hours minimum:  Change oxygen saturation probe site  4.  Every 4-6 hours:  If on nasal continuous positive airway pressure, respiratory therapy assess nares and determine need for appliance change or resting period  2/15/2025 1039 by Joanie Borrego RN  Outcome: Progressing  Flowsheets (Taken 2/15/2025 0800)  Skin Integrity Remains Intact: Monitor for areas of redness and/or skin breakdown  2/14/2025 2321 by Yamila Hong RN  Outcome: Progressing  Flowsheets  Taken 2/14/2025 2321  Skin Integrity Remains Intact: Monitor for areas of redness and/or skin breakdown  Taken 2/14/2025 2000  Skin Integrity Remains Intact: Monitor for areas of redness and/or skin breakdown     Problem: Nutrition Deficit:  Goal: Optimize nutritional status  2/15/2025 1039 by Joanie Borrego, RN  Outcome: Progressing  2/14/2025 2321 by Yamila Hong RN  Outcome: Progressing  Flowsheets (Taken 2/14/2025 2321)  Nutrient intake appropriate for improving, restoring, or maintaining nutritional needs:   Assess nutritional status and recommend course of action   Recommend appropriate diets, oral nutritional supplements, and vitamin/mineral supplements   Monitor oral intake, labs, and treatment plans   Order, calculate, and assess calorie counts as needed

## 2025-02-15 NOTE — DISCHARGE SUMMARY
Discharge Summary       PATIENT ID: Ward Bhatia  MRN: [id number]   YOB: 1980    DATE OF ADMISSION: 2/7/2025 12:37 PM    DATE OF DISCHARGE: 2/15/2025   PRIMARY CARE PROVIDER: No primary care provider on file.     ATTENDING PHYSICIAN: ISABELLE RUIZ MD   DISCHARGING PROVIDER: Isabelle Ruiz MD    To contact this individual call [phone] and ask the  to page.  If unavailable ask to be transferred the Adult Hospitalist Department.    CONSULTATIONS: IP CONSULT TO GI  IP CONSULT TO CASE MANAGEMENT    PROCEDURES/SURGERIES: Procedure(s):  ESOPHAGOGASTRODUODENOSCOPY    ADMITTING DIAGNOSES & HOSPITAL COURSE:   Ward Bhatia is a 44 y.o. male with a pmhx IfA nephropathy who presents with poor appetite, and fatigue for the past week and is being admitted for PEMA on CKD.     In the ED, BP was elevated to 199/101.  Labs showed normocytic anemia with Hgb 9.3 (b/l 11.4), CO2 9, AG 21, creatinine 16.5, and Ca2+ 6.4.       In the ED, nephrology was consulted, and plan for initiation of HD         DISCHARGE DIAGNOSES / PLAN:      #IGA nephropathy  #new onset ESRD  #anemia of CKD  -admitted for tunneled cath to start HD  -appreciate nephrology, CM to look for outpatient dialysis  - PC 2/14    Outpatient HD spot [facility], 1st outpatient will be Tuesday. D/w renal  Dc home after Sat dialysis. Next outpatient dialysis on Tuesday         #htn uncontrolled  -prn hydralazine, labetalol  -continue home norvasc, ARB, cardura. Add Metoprolol  -wean cardene gtt  dc 2/14   -BP meds adjusted per renal      #seizure disorder:   replaced home phenytoin with keppra as preferred for ESRD  Outpatient dialysis at 6am T/TH/S, advice mother to give pt keppra after dialysis      #N/V  Dysphagia  -r/t new ESRD/HD?  -s/p EGD 2/11  Esophagus:hiatal hernia 3 cm in size   Rest of esophagus was normal   -short course ATC Reglan before meals , advance diet   Tolerate         Stomach: normal   Duodenum: normal     -Appreciate GI, PPI

## 2025-02-15 NOTE — DISCHARGE INSTRUCTIONS
Discharge Instructions       PATIENT ID: Ward Bhatia  MRN: [id number]   YOB: 1980    DATE OF ADMISSION: [unfilled]    DATE OF DISCHARGE: 2/15/2025    PRIMARY CARE PROVIDER: @PCP@     ATTENDING PHYSICIAN: [unfilled]  DISCHARGING PROVIDER: Isabelle Cortes MD    To contact this individual call [phone] and ask the  to page.   If unavailable ask to be transferred the Adult Hospitalist Department.    DISCHARGE DIAGNOSES ESRD, seizure disease , hypertension     CONSULTATIONS: [unfilled]    PROCEDURES/SURGERIES: Procedure(s):  ESOPHAGOGASTRODUODENOSCOPY    PENDING TEST RESULTS:   At the time of discharge the following test results are still pending: none     FOLLOW UP APPOINTMENTS:   [unfilled]     ADDITIONAL CARE RECOMMENDATIONS:   [facility] currently has T/TH/Sat 1st Shift 6:00am-6:45am : first dialysis on Tuessday 2/18/2025   Your home seizure medicine is changed due to your kidney disease. Taking keppra once a day. On your dialysis day, make sure you are taking keppra after dialysis.   Monitor your blood pressure   Can take zofran as needed for nausea     DIET: {diet:43553}  Oral Nutritional Supplements: {RDSUPPLEMENTLIST:24989} {RDSUPPFREQUENCY:33947}     ACTIVITY: {discharge activity:51643}    WOUND CARE: ***    EQUIPMENT needed: ***      Radiology      DISCHARGE MEDICATIONS:   See Medication Reconciliation Form    It is important that you take the medication exactly as they are prescribed.   Keep your medication in the bottles provided by the pharmacist and keep a list of the medication names, dosages, and times to be taken in your wallet.   Do not take other medications without consulting your doctor.       NOTIFY YOUR PHYSICIAN FOR ANY OF THE FOLLOWING:   Fever over 101 degrees for 24 hours.   Chest pain, shortness of breath, fever, chills, nausea, vomiting, diarrhea, change in mentation, falling, weakness, bleeding. Severe pain or pain not relieved by medications.  Or, any other

## 2025-03-21 ENCOUNTER — HOSPITAL ENCOUNTER (OUTPATIENT)
Facility: HOSPITAL | Age: 45
Discharge: HOME | End: 2025-03-24
Payer: MEDICARE

## 2025-03-21 VITALS
TEMPERATURE: 98.4 F | BODY MASS INDEX: 38.99 KG/M2 | HEART RATE: 75 BPM | DIASTOLIC BLOOD PRESSURE: 82 MMHG | SYSTOLIC BLOOD PRESSURE: 138 MMHG | WEIGHT: 248.4 LBS | HEIGHT: 67 IN

## 2025-03-21 LAB
ABO + RH BLD: NORMAL
ALBUMIN SERPL-MCNC: 3.2 G/DL (ref 3.5–5)
ALBUMIN/GLOB SERPL: 0.9 (ref 1.1–2.2)
ALP SERPL-CCNC: 87 U/L (ref 45–117)
ALT SERPL-CCNC: 15 U/L (ref 12–78)
ANION GAP SERPL CALC-SCNC: 2 MMOL/L (ref 2–12)
APTT PPP: 25.9 SEC (ref 22.1–31)
AST SERPL-CCNC: 8 U/L (ref 15–37)
BASOPHILS # BLD: 0.07 K/UL (ref 0–0.1)
BASOPHILS NFR BLD: 1 % (ref 0–1)
BILIRUB SERPL-MCNC: 0.4 MG/DL (ref 0.2–1)
BLOOD GROUP ANTIBODIES SERPL: NORMAL
BUN SERPL-MCNC: 24 MG/DL (ref 6–20)
BUN/CREAT SERPL: 4 (ref 12–20)
CALCIUM SERPL-MCNC: 8.5 MG/DL (ref 8.5–10.1)
CHLORIDE SERPL-SCNC: 105 MMOL/L (ref 97–108)
CO2 SERPL-SCNC: 33 MMOL/L (ref 21–32)
CREAT SERPL-MCNC: 6.37 MG/DL (ref 0.7–1.3)
DIFFERENTIAL METHOD BLD: ABNORMAL
EOSINOPHIL # BLD: 0.14 K/UL (ref 0–0.4)
EOSINOPHIL NFR BLD: 2 % (ref 0–7)
ERYTHROCYTE [DISTWIDTH] IN BLOOD BY AUTOMATED COUNT: 13.9 % (ref 11.5–14.5)
GLOBULIN SER CALC-MCNC: 3.4 G/DL (ref 2–4)
GLUCOSE SERPL-MCNC: 109 MG/DL (ref 65–100)
HCT VFR BLD AUTO: 27.4 % (ref 36.6–50.3)
HGB BLD-MCNC: 8.6 G/DL (ref 12.1–17)
IMM GRANULOCYTES # BLD AUTO: 0.04 K/UL (ref 0–0.04)
IMM GRANULOCYTES NFR BLD AUTO: 0.6 % (ref 0–0.5)
INR PPP: 1 (ref 0.9–1.1)
LYMPHOCYTES # BLD: 1.63 K/UL (ref 0.8–3.5)
LYMPHOCYTES NFR BLD: 23.7 % (ref 12–49)
MCH RBC QN AUTO: 27.5 PG (ref 26–34)
MCHC RBC AUTO-ENTMCNC: 31.4 G/DL (ref 30–36.5)
MCV RBC AUTO: 87.5 FL (ref 80–99)
MONOCYTES # BLD: 0.87 K/UL (ref 0–1)
MONOCYTES NFR BLD: 12.6 % (ref 5–13)
NEUTS SEG # BLD: 4.14 K/UL (ref 1.8–8)
NEUTS SEG NFR BLD: 60.1 % (ref 32–75)
NRBC # BLD: 0 K/UL (ref 0–0.01)
NRBC BLD-RTO: 0 PER 100 WBC
PLATELET # BLD AUTO: 311 K/UL (ref 150–400)
PMV BLD AUTO: 9.7 FL (ref 8.9–12.9)
POTASSIUM SERPL-SCNC: 4.3 MMOL/L (ref 3.5–5.1)
PROT SERPL-MCNC: 6.6 G/DL (ref 6.4–8.2)
PROTHROMBIN TIME: 10.4 SEC (ref 9.2–11.2)
RBC # BLD AUTO: 3.13 M/UL (ref 4.1–5.7)
SODIUM SERPL-SCNC: 140 MMOL/L (ref 136–145)
SPECIMEN EXP DATE BLD: NORMAL
THERAPEUTIC RANGE: NORMAL SECS (ref 58–77)
WBC # BLD AUTO: 6.9 K/UL (ref 4.1–11.1)

## 2025-03-21 PROCEDURE — 80053 COMPREHEN METABOLIC PANEL: CPT

## 2025-03-21 PROCEDURE — 86850 RBC ANTIBODY SCREEN: CPT

## 2025-03-21 PROCEDURE — 86901 BLOOD TYPING SEROLOGIC RH(D): CPT

## 2025-03-21 PROCEDURE — 85025 COMPLETE CBC W/AUTO DIFF WBC: CPT

## 2025-03-21 PROCEDURE — 85610 PROTHROMBIN TIME: CPT

## 2025-03-21 PROCEDURE — 86900 BLOOD TYPING SEROLOGIC ABO: CPT

## 2025-03-21 PROCEDURE — 85730 THROMBOPLASTIN TIME PARTIAL: CPT

## 2025-03-21 NOTE — FLOWSHEET NOTE
MOTHER REPORTS PT HAD A SLEEP STUDY IN THE PAST AFTER PT HAD A SEIZURE AND SHE STATES HE WAS NOT DX'D W/ CHANG AT THAT TIME.  DOES NOT WANT REFERRAL AT THIS TIME FOR ANOTHER FOR CHANG SCORE 5/8      CALLED TO SURGEON'S OFFICE TO HAVE PAT ORDERS FAXED OVER.  NO ORDERS IN CC OR ON PAPER W/ CHART.  SPOKE TO SRUTHI.  ORDERS REC'D AND PLACED ON CHART.  SURGICAL CONSENT WAS NOT SIGNED.  PT'S MOTHER IS POA AND WILL BRING THAT PAPER WORK DAY OF SURGERY.  SHE WILL BE SIGNING CONSENT.      PT HAS AUTISM.  PT'S MOTHER, LEANNA MEHTA IS PT'S LEGAL GUARDIAN AND POA.  MOTHER PRESENT AT [facility] APPT.  ADVISED MOTHER TO BRING POA PAPER WORK TO [facility] DAY OF SURGERY.  MOTHER VERBALIZED UNDERSTANDING.

## 2025-03-21 NOTE — PERIOP NOTE
[address] [address]      MAIN PRE OP             [phone]                                                                                AMBULATORY PRE OP          [phone]    PRE-ADMISSION TESTING    [phone]     Surgery Date:  3/28/25        [facility] staff will call you between 4 and 7pm the day before your surgery with your arrival time. (If your surgery is on a Monday, we will call you the Friday before.)    Call [phone] after 7pm Monday-Friday if you did not receive this call.    INSTRUCTIONS BEFORE YOUR SURGERY   When You  Arrive Arrive at [facility] Patient Access on 1st floor the day of your surgery.  Have your insurance card, photo ID,living will/advanced directive/POA (if applicable),  and any copayment (if needed)   Food   and   Drink NO solid food after midnight the night before surgery. You can drink clear liquids from midnight until ONE hour prior to your arrival at the hospital on the day of your surgery. Clear liquids include:  Water  Apple juice (no sediment)  Carbonated beverages  Black coffee(no cream/milk)  Tea(no cream/milk)  Gatorade    No alcohol (beer, wine, liquor) or marijuana (smoking) 24 hours prior to surgery.   No edibles for 3 days prior to surgery.    Stop smoking cigarettes 14 days before surgery (helps w/healing and breathing).   Medications to   TAKE   Morning of Surgery MEDICATIONS TO TAKE THE MORNING OF SURGERY:                      METOPROLOL AND PANTOPRAZOLE    You may take these medications, IF NEEDED, the morning of surgery: HYDROXYZINE    Ask your surgeon/prescribing doctor for instructions on taking or stopping these medications prior to surgery: NONE   Medications to STOP  before surgery Non-Steroidal anti-inflammatory Drugs (NSAID's): for example, Diclofenac (Voltaren), Ibuprofen (Advil, Motrin), Naproxen (Aleve) 7 days    STOP all herbal supplements and vitamins(unless prescribed by

## 2025-03-22 ENCOUNTER — HOSPITAL ENCOUNTER (OUTPATIENT)
Facility: HOSPITAL | Age: 45
Setting detail: SPECIMEN
Discharge: HOME | End: 2025-03-25

## 2025-03-22 LAB — HGB BLD-MCNC: 8.9 G/DL (ref 12.1–17)

## 2025-03-22 PROCEDURE — 85018 HEMOGLOBIN: CPT

## 2025-03-24 NOTE — PERIOP NOTE
ABNORMAL LABS FAXED AND CALLED TO DR. ESCAMILLA'S OFFICE:  ABNORMAL CMP  HGB: 8.6  T&S ORDERED STAT DOS

## 2025-03-28 ENCOUNTER — ANESTHESIA EVENT (OUTPATIENT)
Facility: HOSPITAL | Age: 45
End: 2025-03-28
Payer: MEDICARE

## 2025-03-28 ENCOUNTER — HOSPITAL ENCOUNTER (OUTPATIENT)
Facility: HOSPITAL | Age: 45
Setting detail: OUTPATIENT SURGERY
Discharge: HOME | End: 2025-03-28
Attending: SURGERY | Admitting: SURGERY
Payer: MEDICARE

## 2025-03-28 ENCOUNTER — ANESTHESIA (OUTPATIENT)
Facility: HOSPITAL | Age: 45
End: 2025-03-28
Payer: MEDICARE

## 2025-03-28 VITALS
RESPIRATION RATE: 19 BRPM | TEMPERATURE: 98.1 F | HEIGHT: 67 IN | HEART RATE: 76 BPM | BODY MASS INDEX: 38.92 KG/M2 | DIASTOLIC BLOOD PRESSURE: 97 MMHG | SYSTOLIC BLOOD PRESSURE: 135 MMHG | WEIGHT: 248 LBS | OXYGEN SATURATION: 98 %

## 2025-03-28 DIAGNOSIS — N17.9 ACUTE KIDNEY INJURY SUPERIMPOSED ON CKD: Primary | ICD-10-CM

## 2025-03-28 DIAGNOSIS — N18.9 ACUTE KIDNEY INJURY SUPERIMPOSED ON CKD: Primary | ICD-10-CM

## 2025-03-28 LAB
ANION GAP BLD CALC-SCNC: 5.9 MMOL/L (ref 10–20)
CA-I BLD-MCNC: 1.01 MMOL/L (ref 1.15–1.33)
CHLORIDE BLD-SCNC: 103 MMOL/L (ref 98–107)
CO2 BLD-SCNC: 31.1 MMOL/L (ref 21–32)
CREAT BLD-MCNC: 10.33 MG/DL (ref 0.6–1.3)
GLUCOSE BLD-MCNC: 106 MG/DL (ref 74–99)
POTASSIUM BLD-SCNC: 4.1 MMOL/L (ref 3.5–5.1)
SERVICE CMNT-IMP: ABNORMAL
SODIUM BLD-SCNC: 140 MMOL/L (ref 136–145)

## 2025-03-28 PROCEDURE — 6360000002 HC RX W HCPCS: Performed by: STUDENT IN AN ORGANIZED HEALTH CARE EDUCATION/TRAINING PROGRAM

## 2025-03-28 PROCEDURE — 2580000003 HC RX 258: Performed by: STUDENT IN AN ORGANIZED HEALTH CARE EDUCATION/TRAINING PROGRAM

## 2025-03-28 PROCEDURE — 2500000003 HC RX 250 WO HCPCS: Performed by: NURSE ANESTHETIST, CERTIFIED REGISTERED

## 2025-03-28 PROCEDURE — 3600000002 HC SURGERY LEVEL 2 BASE: Performed by: SURGERY

## 2025-03-28 PROCEDURE — 7100000000 HC PACU RECOVERY - FIRST 15 MIN: Performed by: SURGERY

## 2025-03-28 PROCEDURE — 3600000012 HC SURGERY LEVEL 2 ADDTL 15MIN: Performed by: SURGERY

## 2025-03-28 PROCEDURE — 80047 BASIC METABLC PNL IONIZED CA: CPT

## 2025-03-28 PROCEDURE — 2709999900 HC NON-CHARGEABLE SUPPLY: Performed by: SURGERY

## 2025-03-28 PROCEDURE — 6360000002 HC RX W HCPCS: Performed by: SURGERY

## 2025-03-28 PROCEDURE — 2720000010 HC SURG SUPPLY STERILE: Performed by: SURGERY

## 2025-03-28 PROCEDURE — 6360000002 HC RX W HCPCS: Performed by: NURSE ANESTHETIST, CERTIFIED REGISTERED

## 2025-03-28 PROCEDURE — 2580000003 HC RX 258: Performed by: NURSE ANESTHETIST, CERTIFIED REGISTERED

## 2025-03-28 PROCEDURE — 3700000000 HC ANESTHESIA ATTENDED CARE: Performed by: SURGERY

## 2025-03-28 PROCEDURE — 3700000001 HC ADD 15 MINUTES (ANESTHESIA): Performed by: SURGERY

## 2025-03-28 PROCEDURE — 7100000001 HC PACU RECOVERY - ADDTL 15 MIN: Performed by: SURGERY

## 2025-03-28 RX ORDER — SODIUM CHLORIDE, SODIUM LACTATE, POTASSIUM CHLORIDE, CALCIUM CHLORIDE 600; 310; 30; 20 MG/100ML; MG/100ML; MG/100ML; MG/100ML
INJECTION, SOLUTION INTRAVENOUS CONTINUOUS
Status: DISCONTINUED | OUTPATIENT
Start: 2025-03-28 | End: 2025-03-28 | Stop reason: HOSPADM

## 2025-03-28 RX ORDER — DEXAMETHASONE SODIUM PHOSPHATE 4 MG/ML
INJECTION, SOLUTION INTRA-ARTICULAR; INTRALESIONAL; INTRAMUSCULAR; INTRAVENOUS; SOFT TISSUE
Status: DISCONTINUED | OUTPATIENT
Start: 2025-03-28 | End: 2025-03-28 | Stop reason: SDUPTHER

## 2025-03-28 RX ORDER — LIDOCAINE HYDROCHLORIDE 10 MG/ML
1 INJECTION, SOLUTION EPIDURAL; INFILTRATION; INTRACAUDAL; PERINEURAL
Status: DISCONTINUED | OUTPATIENT
Start: 2025-03-28 | End: 2025-03-28 | Stop reason: HOSPADM

## 2025-03-28 RX ORDER — SODIUM CHLORIDE 0.9 % (FLUSH) 0.9 %
5-40 SYRINGE (ML) INJECTION PRN
Status: DISCONTINUED | OUTPATIENT
Start: 2025-03-28 | End: 2025-03-28 | Stop reason: HOSPADM

## 2025-03-28 RX ORDER — FENTANYL CITRATE 50 UG/ML
25 INJECTION, SOLUTION INTRAMUSCULAR; INTRAVENOUS EVERY 5 MIN PRN
Status: DISCONTINUED | OUTPATIENT
Start: 2025-03-28 | End: 2025-03-28 | Stop reason: HOSPADM

## 2025-03-28 RX ORDER — ONDANSETRON 2 MG/ML
INJECTION INTRAMUSCULAR; INTRAVENOUS
Status: DISCONTINUED | OUTPATIENT
Start: 2025-03-28 | End: 2025-03-28 | Stop reason: SDUPTHER

## 2025-03-28 RX ORDER — FENTANYL CITRATE 50 UG/ML
INJECTION, SOLUTION INTRAMUSCULAR; INTRAVENOUS
Status: DISCONTINUED | OUTPATIENT
Start: 2025-03-28 | End: 2025-03-28 | Stop reason: SDUPTHER

## 2025-03-28 RX ORDER — ROPIVACAINE HYDROCHLORIDE 5 MG/ML
INJECTION, SOLUTION EPIDURAL; INFILTRATION; PERINEURAL
Status: COMPLETED | OUTPATIENT
Start: 2025-03-28 | End: 2025-03-28

## 2025-03-28 RX ORDER — MIDAZOLAM HYDROCHLORIDE 2 MG/2ML
2 INJECTION, SOLUTION INTRAMUSCULAR; INTRAVENOUS PRN
Status: DISCONTINUED | OUTPATIENT
Start: 2025-03-28 | End: 2025-03-28 | Stop reason: HOSPADM

## 2025-03-28 RX ORDER — SODIUM CHLORIDE 0.9 % (FLUSH) 0.9 %
5-40 SYRINGE (ML) INJECTION EVERY 12 HOURS SCHEDULED
Status: DISCONTINUED | OUTPATIENT
Start: 2025-03-28 | End: 2025-03-28 | Stop reason: HOSPADM

## 2025-03-28 RX ORDER — LABETALOL HYDROCHLORIDE 5 MG/ML
10 INJECTION, SOLUTION INTRAVENOUS
Status: DISCONTINUED | OUTPATIENT
Start: 2025-03-28 | End: 2025-03-28 | Stop reason: HOSPADM

## 2025-03-28 RX ORDER — NALOXONE HYDROCHLORIDE 0.4 MG/ML
INJECTION, SOLUTION INTRAMUSCULAR; INTRAVENOUS; SUBCUTANEOUS PRN
Status: DISCONTINUED | OUTPATIENT
Start: 2025-03-28 | End: 2025-03-28 | Stop reason: HOSPADM

## 2025-03-28 RX ORDER — HEPARIN SODIUM 1000 [USP'U]/ML
INJECTION, SOLUTION INTRAVENOUS; SUBCUTANEOUS
Status: DISCONTINUED | OUTPATIENT
Start: 2025-03-28 | End: 2025-03-28 | Stop reason: SDUPTHER

## 2025-03-28 RX ORDER — HYDRALAZINE HYDROCHLORIDE 20 MG/ML
10 INJECTION INTRAMUSCULAR; INTRAVENOUS
Status: DISCONTINUED | OUTPATIENT
Start: 2025-03-28 | End: 2025-03-28 | Stop reason: HOSPADM

## 2025-03-28 RX ORDER — PROPOFOL 10 MG/ML
INJECTION, EMULSION INTRAVENOUS
Status: DISCONTINUED | OUTPATIENT
Start: 2025-03-28 | End: 2025-03-28 | Stop reason: SDUPTHER

## 2025-03-28 RX ORDER — ROPIVACAINE HYDROCHLORIDE 5 MG/ML
30 INJECTION, SOLUTION EPIDURAL; INFILTRATION; PERINEURAL ONCE
Status: DISCONTINUED | OUTPATIENT
Start: 2025-03-28 | End: 2025-03-28 | Stop reason: HOSPADM

## 2025-03-28 RX ORDER — MIDAZOLAM HYDROCHLORIDE 1 MG/ML
INJECTION, SOLUTION INTRAMUSCULAR; INTRAVENOUS
Status: DISCONTINUED | OUTPATIENT
Start: 2025-03-28 | End: 2025-03-28 | Stop reason: SDUPTHER

## 2025-03-28 RX ORDER — ACETAMINOPHEN 500 MG
1000 TABLET ORAL ONCE
Status: DISCONTINUED | OUTPATIENT
Start: 2025-03-28 | End: 2025-03-28 | Stop reason: HOSPADM

## 2025-03-28 RX ORDER — LIDOCAINE HYDROCHLORIDE 10 MG/ML
INJECTION, SOLUTION INFILTRATION; PERINEURAL PRN
Status: DISCONTINUED | OUTPATIENT
Start: 2025-03-28 | End: 2025-03-28 | Stop reason: HOSPADM

## 2025-03-28 RX ORDER — FENTANYL CITRATE 50 UG/ML
100 INJECTION, SOLUTION INTRAMUSCULAR; INTRAVENOUS
Status: COMPLETED | OUTPATIENT
Start: 2025-03-28 | End: 2025-03-28

## 2025-03-28 RX ORDER — OXYCODONE HYDROCHLORIDE 5 MG/1
5 TABLET ORAL
Status: DISCONTINUED | OUTPATIENT
Start: 2025-03-28 | End: 2025-03-28 | Stop reason: HOSPADM

## 2025-03-28 RX ORDER — OXYCODONE HYDROCHLORIDE 5 MG/1
5 TABLET ORAL EVERY 6 HOURS PRN
Qty: 28 TABLET | Refills: 0 | Status: SHIPPED | OUTPATIENT
Start: 2025-03-28 | End: 2025-04-04

## 2025-03-28 RX ORDER — PROCHLORPERAZINE EDISYLATE 5 MG/ML
5 INJECTION INTRAMUSCULAR; INTRAVENOUS
Status: DISCONTINUED | OUTPATIENT
Start: 2025-03-28 | End: 2025-03-28 | Stop reason: HOSPADM

## 2025-03-28 RX ORDER — ONDANSETRON 2 MG/ML
4 INJECTION INTRAMUSCULAR; INTRAVENOUS
Status: DISCONTINUED | OUTPATIENT
Start: 2025-03-28 | End: 2025-03-28 | Stop reason: HOSPADM

## 2025-03-28 RX ORDER — SODIUM CHLORIDE 9 MG/ML
INJECTION, SOLUTION INTRAVENOUS PRN
Status: DISCONTINUED | OUTPATIENT
Start: 2025-03-28 | End: 2025-03-28 | Stop reason: HOSPADM

## 2025-03-28 RX ORDER — DEXMEDETOMIDINE HYDROCHLORIDE 100 UG/ML
INJECTION, SOLUTION INTRAVENOUS
Status: DISCONTINUED | OUTPATIENT
Start: 2025-03-28 | End: 2025-03-28 | Stop reason: SDUPTHER

## 2025-03-28 RX ORDER — HYDROMORPHONE HYDROCHLORIDE 1 MG/ML
0.5 INJECTION, SOLUTION INTRAMUSCULAR; INTRAVENOUS; SUBCUTANEOUS EVERY 5 MIN PRN
Status: DISCONTINUED | OUTPATIENT
Start: 2025-03-28 | End: 2025-03-28 | Stop reason: HOSPADM

## 2025-03-28 RX ADMIN — PROPOFOL 40 MG: 10 INJECTION, EMULSION INTRAVENOUS at 13:53

## 2025-03-28 RX ADMIN — PROPOFOL 50 MCG/KG/MIN: 10 INJECTION, EMULSION INTRAVENOUS at 13:23

## 2025-03-28 RX ADMIN — ONDANSETRON 4 MG: 2 INJECTION, SOLUTION INTRAMUSCULAR; INTRAVENOUS at 13:28

## 2025-03-28 RX ADMIN — PHENYLEPHRINE HYDROCHLORIDE 25 MCG/MIN: 10 INJECTION INTRAVENOUS at 13:23

## 2025-03-28 RX ADMIN — DEXMEDETOMIDINE 10 MCG: 100 INJECTION, SOLUTION, CONCENTRATE INTRAVENOUS at 13:21

## 2025-03-28 RX ADMIN — FENTANYL CITRATE 100 MCG: 0.05 INJECTION, SOLUTION INTRAMUSCULAR; INTRAVENOUS at 12:36

## 2025-03-28 RX ADMIN — MIDAZOLAM HYDROCHLORIDE 2 MG: 1 INJECTION, SOLUTION INTRAMUSCULAR; INTRAVENOUS at 12:36

## 2025-03-28 RX ADMIN — MIDAZOLAM HYDROCHLORIDE 2 MG: 1 INJECTION, SOLUTION INTRAMUSCULAR; INTRAVENOUS at 13:13

## 2025-03-28 RX ADMIN — MEPIVACAINE HYDROCHLORIDE 10 ML: 15 INJECTION, SOLUTION EPIDURAL; INFILTRATION at 12:41

## 2025-03-28 RX ADMIN — ROPIVACAINE HYDROCHLORIDE 25 ML: 5 INJECTION EPIDURAL; INFILTRATION; PERINEURAL at 12:41

## 2025-03-28 RX ADMIN — DEXAMETHASONE SODIUM PHOSPHATE 4 MG: 4 INJECTION INTRA-ARTICULAR; INTRALESIONAL; INTRAMUSCULAR; INTRAVENOUS; SOFT TISSUE at 13:28

## 2025-03-28 RX ADMIN — FENTANYL CITRATE 25 MCG: 50 INJECTION INTRAMUSCULAR; INTRAVENOUS at 15:01

## 2025-03-28 RX ADMIN — SODIUM CHLORIDE: 9 INJECTION, SOLUTION INTRAVENOUS at 13:13

## 2025-03-28 RX ADMIN — FENTANYL CITRATE 25 MCG: 50 INJECTION INTRAMUSCULAR; INTRAVENOUS at 15:25

## 2025-03-28 RX ADMIN — FENTANYL CITRATE 25 MCG: 50 INJECTION INTRAMUSCULAR; INTRAVENOUS at 14:28

## 2025-03-28 RX ADMIN — MIDAZOLAM HYDROCHLORIDE 1 MG: 1 INJECTION, SOLUTION INTRAMUSCULAR; INTRAVENOUS at 13:51

## 2025-03-28 RX ADMIN — HEPARIN SODIUM 6000 UNITS: 1000 INJECTION INTRAVENOUS; SUBCUTANEOUS at 14:51

## 2025-03-28 RX ADMIN — MIDAZOLAM HYDROCHLORIDE 2 MG: 1 INJECTION, SOLUTION INTRAMUSCULAR; INTRAVENOUS at 14:24

## 2025-03-28 RX ADMIN — FENTANYL CITRATE 25 MCG: 50 INJECTION INTRAMUSCULAR; INTRAVENOUS at 13:37

## 2025-03-28 ASSESSMENT — PAIN - FUNCTIONAL ASSESSMENT: PAIN_FUNCTIONAL_ASSESSMENT: 0-10

## 2025-03-28 NOTE — H&P
Pre-op History and Physical    CC: End stage renal disease (HCC) [N18.6]   HPI: 44 y.o. year old male with End stage renal disease (HCC) [N18.6] for Procedure(s):  RIGHT ARM BASILIC VEIN TRANSPOSITION (MAC WITH REGIONAL BLOCK).    Past medical history:   Past Medical History:   Diagnosis Date    Autism     Hemodialysis patient     TUES, THURS. SAT    Hypertension     Seizure (HCC) 2025    last seizure during 1st dilaysis IN 02/2025      Past surgical history:   Past Surgical History:   Procedure Laterality Date    CT BIOPSY RENAL  4/18/2024    CT BIOPSY RENAL 4/18/2024 [facility] RAD CT    IR INSERT NON TUNNELED CV CATH <5 YEARS  2/7/2025    IR INSERT NON TUNNELED CV CATH <5 YEARS 2/7/2025 [facility] RAD ANGIO IR    IR INSERT TUNNELED CV CATH WO SQ PORT/PUMP < 5YRS  2/14/2025    IR INSERT TUNNELED CV CATH WO SQ PORT/PUMP < 5YRS 2/14/2025 Kevyn Rene Jr., APRN - NP [facility] RAD ANGIO IR    UPPER GASTROINTESTINAL ENDOSCOPY N/A 2/11/2025    ESOPHAGOGASTRODUODENOSCOPY performed by Jose G Casarez MD at [facility] ENDOSCOPY      Family history:   Family History   Problem Relation Age of Onset    Hypertension Mother     COPD Father     Diabetes Father     Anesth Problems Neg Hx       Social history:   Social History     Socioeconomic History    Marital status: Single     Spouse name: Not on file    Number of children: Not on file    Years of education: Not on file    Highest education level: Not on file   Occupational History    Not on file   Tobacco Use    Smoking status: Never    Smokeless tobacco: Never   Vaping Use    Vaping status: Never Used   Substance and Sexual Activity    Alcohol use: Never    Drug use: Never    Sexual activity: Not on file   Other Topics Concern    Not on file   Social History Narrative    Not on file     Social Drivers of Health     Financial Resource Strain: Not on file   Food Insecurity: No Food Insecurity (2/7/2025)    Hunger Vital Sign     Worried About Running Out of Food in the Last Year: Never true      Ran Out of Food in the Last Year: Never true   Transportation Needs: No Transportation Needs (2/7/2025)    PRAPARE - Transportation     Lack of Transportation (Medical): No     Lack of Transportation (Non-Medical): No   Physical Activity: Not on file   Stress: Not on file   Social Connections: Not on file   Intimate Partner Violence: Not on file   Housing Stability: Low Risk  (2/7/2025)    Housing Stability Vital Sign     Unable to Pay for Housing in the Last Year: No     Number of Times Moved in the Last Year: 1     Homeless in the Last Year: No      Home Medications:   Prior to Admission medications    Medication Sig Start Date End Date Taking? Authorizing Provider   pantoprazole (PROTONIX) 40 MG tablet Take 1 tablet by mouth every morning (before breakfast)  Patient taking differently: Take 1 tablet by mouth daily 2/16/25  Yes Isabelle Cortes MD   metoprolol tartrate (LOPRESSOR) 50 MG tablet Take 1 tablet by mouth 2 times daily 2/15/25  Yes Isabelle Cortes MD   losartan (COZAAR) 100 MG tablet Take 1 tablet by mouth daily  Patient taking differently: Take 1 tablet by mouth Daily with lunch 2/16/25  Yes Isabelle Cortes MD   amLODIPine (NORVASC) 10 MG tablet Take 1 tablet by mouth daily  Patient taking differently: Take 1 tablet by mouth nightly 2/16/25  Yes Isabelle Cortes MD   levETIRAcetam (KEPPRA) 500 MG tablet Take 1 tablet by mouth daily  Patient taking differently: Take 1 tablet by mouth Daily with lunch 2/15/25  Yes Isabelle Cortes MD   hydrOXYzine HCl (ATARAX) 25 MG tablet Take 1 tablet by mouth as needed for Anxiety    ProviderLayton MD      Allergies: No Known Allergies   Review of systems:  Denies headache, fever, chills, weight change, congestion, sore throat, chest pain, shortness of breath, nausea, vomiting, diarrhea, constipation, abdominal pain, generalized weakness, muscle or joint pain, and rash.    Physical Exam:  Vitals: Blood pressure 138/89, pulse 71, temperature 98 °F (36.7 °C), temperature source Oral, resp. rate 16,

## 2025-03-28 NOTE — BRIEF OP NOTE
Brief Postoperative Note      Patient: Ward Bhatia  YOB: 1980  MRN: [id number]    Date of Procedure: 3/28/2025    Pre-Op Diagnosis Codes:      * End stage renal disease (HCC) [N18.6]    Post-Op Diagnosis: Same       Procedure(s):  RIGHT ARM BASILIC VEIN TRANSPOSITION (MAC WITH REGIONAL BLOCK)    Surgeon(s):  Rio Green MD    Assistant:  Surgical Assistant: Zoë Cueto Cindy M    Anesthesia: Monitor Anesthesia Care    Estimated Blood Loss (mL): Minimal    Complications: None    Specimens:   * No specimens in log *    Implants:  * No implants in log *      Drains: * No LDAs found *    Findings:  Infection Present At Time Of Surgery (PATOS) (choose all levels that have infection present):  No infection present  Other Findings: deep vein    Electronically signed by Rio Green MD on 3/28/2025 at 3:45 PM

## 2025-03-28 NOTE — DISCHARGE INSTRUCTIONS
Can change dressings with gauze and tape in 48 hours.  Can bathe arm as routine after 48 hours.    Please call [phone] for follow up appointment in 2 weeks.    ______________________________________________________________________    Anesthesia Discharge Instructions    After general anesthesia or intervenous sedation, for 24 hours or while taking prescription Narcotics:  Limit your activities  Do not drive or operate hazardous machinery  If you have not urinated within 8 hours after discharge, please contact your surgeon on call.  Do not make important personal or business decisions  Do not drink alcoholic beverages    Report the following to your surgeon:  Excessive pain, swelling, redness or odor of or around the surgical area  Temperature over 100.5 degrees  Nausea and vomiting lasting longer than 4 hours or if unable to take medication  Any signs of decreased circulation or nerve impairment to extremity:  Change in color, persistent numbness, tingling, coldness or increased pain.  Any questions

## 2025-03-28 NOTE — ANESTHESIA PRE PROCEDURE
Department of Anesthesiology  Preprocedure Note       Name:  Ward Bhatia   Age:  44 y.o.  :  1980                                          MRN:  [id number]         Date:  3/28/2025      Surgeon: Surgeon(s):  Rio Green MD    Procedure: Procedure(s):  RIGHT ARM BASILIC VEIN TRANSPOSITION (MAC WITH REGIONAL BLOCK)    Medications prior to admission:   Prior to Admission medications    Medication Sig Start Date End Date Taking? Authorizing Provider   pantoprazole (PROTONIX) 40 MG tablet Take 1 tablet by mouth every morning (before breakfast)  Patient taking differently: Take 1 tablet by mouth daily 25   Isabelle Cortes MD   metoprolol tartrate (LOPRESSOR) 50 MG tablet Take 1 tablet by mouth 2 times daily 2/15/25   Isabelle Cortes MD   losartan (COZAAR) 100 MG tablet Take 1 tablet by mouth daily  Patient taking differently: Take 1 tablet by mouth Daily with lunch 25   Isabelle Cortes MD   amLODIPine (NORVASC) 10 MG tablet Take 1 tablet by mouth daily  Patient taking differently: Take 1 tablet by mouth nightly 25   Isabelle Cortes MD   levETIRAcetam (KEPPRA) 500 MG tablet Take 1 tablet by mouth daily  Patient taking differently: Take 1 tablet by mouth Daily with lunch 2/15/25   Isabelle Cortes MD   hydrOXYzine HCl (ATARAX) 25 MG tablet Take 1 tablet by mouth as needed for Anxiety    Provider, MD Layton       Current medications:    No current facility-administered medications for this encounter.       Allergies:  No Known Allergies    Problem List:    Patient Active Problem List   Diagnosis Code    Proteinuria R80.9    Acute kidney injury superimposed on CKD N17.9, N18.9       Past Medical History:        Diagnosis Date    Autism     Hemodialysis patient     TUES, THURS. SAT    Hypertension     Seizure (HCC)     last seizure during 1st dilaysis IN 2025       Past Surgical History:        Procedure Laterality Date    CT BIOPSY RENAL  2024    CT BIOPSY RENAL 2024 [facility] RAD CT    IR INSERT NON TUNNELED CV

## 2025-03-28 NOTE — ANESTHESIA PROCEDURE NOTES
Peripheral Block    Patient location during procedure: pre-op  Reason for block: post-op pain management, primary anesthetic and at surgeon's request  Start time: 3/28/2025 12:31 PM  End time: 3/28/2025 12:42 PM  Staffing  Performed: anesthesiologist   Anesthesiologist: Caroline Sherwood MD  Performed by: Caroline Sherwood MD  Authorized by: Caroline Sherwood MD    Preanesthetic Checklist  Completed: patient identified, IV checked, site marked, risks and benefits discussed, surgical/procedural consents, equipment checked, pre-op evaluation, timeout performed, anesthesia consent given, oxygen available, monitors applied/VS acknowledged and blood product R/B/A discussed and consented  Peripheral Block   Patient position: supine  Prep: ChloraPrep  Provider prep: mask and sterile gloves  Patient monitoring: cardiac monitor, continuous pulse ox, frequent blood pressure checks, IV access, oxygen and responsive to questions  Block type: Brachial plexus  Supraclavicular  Laterality: right  Injection technique: single-shot  Guidance: nerve stimulator and ultrasound guided    Needle   Needle type: insulated echogenic nerve stimulator needle   Needle localization: nerve stimulator and ultrasound guidance  Assessment   Injection assessment: negative aspiration for heme, no paresthesia on injection, local visualized surrounding nerve on ultrasound and no intravascular symptoms  Paresthesia pain: none  Hemodynamics: stable  Outcomes: uncomplicated and patient tolerated procedure well    Additional Notes  Ultrasound-guided Right supraclavicular nerve block and Right intercostobrachial nerve block. Patient tolerated the procedure well without any immediate complications.    Medications Administered  mepivacaine (CARBOCAINE) injection 1.5% - Perineural   10 mL - 3/28/2025 12:41:00 PM  ropivacaine (NAROPIN) injection 0.5% - Perineural   25 mL - 3/28/2025 12:31:00 PM

## 2025-03-31 NOTE — ANESTHESIA POSTPROCEDURE EVALUATION
Department of Anesthesiology  Postprocedure Note    Patient: Ward Bhatia  MRN: [id number]  YOB: 1980  Date of evaluation: 3/31/2025    Procedure Summary       Date: 03/28/25 Room / Location: [facility] MAIN OR [address] [facility] MAIN OR    Anesthesia Start: 1318 Anesthesia Stop: 1546    Procedure: RIGHT ARM BASILIC VEIN TRANSPOSITION (MAC WITH REGIONAL BLOCK) (Right: Arm Upper) Diagnosis:       End stage renal disease (HCC)      (End stage renal disease (HCC) [N18.6])    Providers: Rio Green MD Responsible Provider: Caroline Sherwood MD    Anesthesia Type: MAC ASA Status: 3            Anesthesia Type: MAC    Madison Phase I: Madison Score: 6    Madison Phase II: Madison Score: 10    Anesthesia Post Evaluation    Level of consciousness: awake  Airway patency: patent  Nausea & Vomiting: no nausea  Cardiovascular status: hemodynamically stable  Respiratory status: acceptable  Hydration status: euvolemic  Pain management: adequate        No notable events documented.   Yes - the patient is able to be screened

## 2025-07-17 NOTE — FLOWSHEET NOTE
02/15/25 0910   Treatment   Treatment Goal 2000   Observations & Evaluations   Level of Consciousness 0   Oriented X 4   Heart Rhythm Regular   Respiratory Quality/Effort Unlabored   O2 Device None (Room air)   Bilateral Breath Sounds Clear   Skin Condition/Temp Dry   Abdomen Inspection Obese   Edema Generalized   Edema Generalized Trace   RUE Edema Trace   LUE Edema Trace   RLE Edema Trace   LLE Edema Trace   Vital Signs   /76   Temp 97.7 °F (36.5 °C)   Pulse (!) 104   Respirations 13   SpO2 97 %   Weight - Scale 109.3 kg (240 lb 15.4 oz)   Weight Method Bed scale   Percent Weight Change -1.18   Pain Assessment   Pain Assessment None - Denies Pain   Technical Checks   Dialysis Machine No. [id number]   RO Machine Number [id number]   Dialyzer Lot No. [id number]   Tubing Lot Number [id number]   All Connections Secure Yes   NS Bag Yes   Saline Line Double Clamped Yes   Dialyzer Nipro ELISIO   Prime Volume (mL) 200 mL   ICEBOAT I;C;E;B;O;A;T   RO Machine Log Sheet Completed Yes   Machine Alarm Self Test Completed;Passed   Air Foam Detector Tested;Proper Function;pH Reading   Extracorporeal Circuit Tested for Integrity Yes   Machine Conductivity 13.9   Manual Ph 7.3   Bleach Test (Neg) Yes   Bath Temperature 97.7 °F (36.5 °C)   Hemodialysis Central Access - Permanent/Tunneled Right Neck   Placement Date/Time: 02/14/25 1507   Present on Admission/Arrival: No  Inserted by: IR  Orientation: Right  Access Location: Neck   Continued need for line? Yes   Site Assessment Clean, dry & intact   Blue Lumen Status Brisk blood return   Red Lumen Status Brisk blood return   Line Care Connections checked and tightened   Dressing Type Sterile dressing, transparent   Date of Last Dressing Change 02/15/25   Dressing Status New dressing applied   Treatment Initiation   Dialyze Hours 3   Treatment  Initiation Universal Precautions maintained;Lines secured to patient;Connections secured;Prime given;Venous Parameters set;Arterial Parameters    Department of Emergency Medicine     Written by: Bienvenido Yu MD  Patient Name: Thee Fountain  Visit Date: 2025 12:55 PM  MRN: [id number]                   : 1941  ------------------------- CC-------------------------  Chief Complaint   Patient presents with    Shortness of Breath     For the past two weeks, hx of CHF     ------------------------- HPI -------------------------    Thee is a 84 y.o. year old male with history of acute on chronic CHF currently on Lasix and Xarelto for A-fib, presents from home via family for shortness of breath and worsening lower extremity swelling.  Family reports that he over the Fourth of July weekend, had increased salt intake, for the past few days have been short of breath, eval for 40%, no recent echo workup.    He has been given a total of 120 mg of Lasix since last night, no significant relief of lower extremity swelling, shortness of breath.  Reports that since last night he has been urinating every 1 hour.  Denies any other symptoms, reports he is short of breath whenever he exerts himself.     Patient currently denies chest pain, nausea vomiting, cough reports back pain that is chronic, no change from his baseline back pain.    There is family/friends at bedside. History is provided by patient/family who is felt to be a good historian.     Nursing notes were all reviewed and agreed with or any disagreements were addressed in the HPI.    REVIEW OF SYSTEMS:    Review of Systems:   Please see HPI above. All bolded are positive. All un-bolded are negative.  Constitutional Symptoms: fever, chills, fatigue, generalized weakness, diaphoresis, increase in thirst, loss of appetite  Eyes: vision change   Ears, Nose, Mouth, Throat: hearing loss, nasal congestion, sores in the mouth  Cardiovascular: chest pain, chest heaviness, palpitations  Respiratory: shortness of breath, wheezing, coughing  Gastrointestinal: abdominal pain, nausea, vomiting, diarrhea,  set;Air foam detector engaged   Dialysis Bath   K+ (Potassium) 3   Ca+ (Calcium) 2.5   Na+ (Sodium) 138   HCO3 (Bicarb) 35     Primary RN SBAR: Elmo Nair RN  Incapacitated Nurse edu. provided: yes  Patient Education provided: yes, r/t dialysis process  Preferred Education method and Primary language: verbal English  [facility] General Consent Verified: yes  Hospital associated wait time; reason: no wait time  Hepatitis B Surface Ag   Date/Time Value Ref Range Status   02/11/2025 06:18 PM <0.10 Index Final     Hep B S Ag Interp   Date/Time Value Ref Range Status   02/11/2025 06:18 PM Negative NEG   Final     Hep B S Ab   Date/Time Value Ref Range Status   02/08/2025 12:58 AM <3.10 mIU/mL Final     Hep B S Ab Interp   Date/Time Value Ref Range Status   02/08/2025 12:58 AM NONREACTIVE NR   Final     Comment:     (NOTE)  The [employer] Anti-HBs2 assay is traceable to the World Health   Organization (WHO) Hepatitis B Immunoglobulin 1st International   Reference Preparation (1977). Samples with a calculated value of 10   mIU/mL or greater are considered reactive (protective) in accordance   with the CDC guidelines. The accepted criteria for immunity to HBV is   anti-HBs activity greater than or equal to 10 mIU/mL, as defined by   the WHO International Reference Preparation.  Assay performance has not been established in pregnant women,   patients who are immunosuppressed or immunocompromised, nor have   performance characteristics been established in conjunction with   other 's assays for specific HBV serologic markers. This   assay does not differentiate between vaccine induced immune response   and a response due to infection with HBV. Passively acquired anti-HBs   may be identified following patient transfusion, receipt of   immunoglobulin products, etc.          02/15/25 0915   Treatment   Time On 0915   Vital Signs   /86   Pulse (!) 101   Respirations 14   SpO2 96 %   During Hemodialysis

## (undated) DEVICE — INTENT OT USE PROVIDES A STERILE INTERFACE BETWEEN THE OPERATING ROOM SURGICAL LAMPS (NON-STERILE) AND THE SURGEON OR STAFF WORKING IN THE STERILE FIELD.: Brand: ASPEN® ALC PLUS LIGHT HANDLE COVER

## (undated) DEVICE — SUTURE PROL 5-0 L18IN NONABSORBABLE BLU RB-2 L13MM 1/2 CIR 8713H

## (undated) DEVICE — AGENT HEMSTAT W4XL4IN OXIDIZED REGENERATED CELOS ABSRB SFT

## (undated) DEVICE — PACK SURG CUST AV FISTULA LF SMH

## (undated) DEVICE — SUTURE PERMAHAND SZ 3-0 L30IN NONABSORBABLE BLK SILK BRAID A304H

## (undated) DEVICE — SPONGE GZ W4XL4IN COT 12 PLY TYP VII WVN C FLD DSGN STERILE

## (undated) DEVICE — PENCIL SMK EVAC 10 FT BLADE ELECTRD ROCKER FOR TELSCP

## (undated) DEVICE — GLOVE ORANGE PI 7   MSG9070

## (undated) DEVICE — LIQUIBAND RAPID ADHESIVE 36/CS 0.8ML: Brand: MEDLINE

## (undated) DEVICE — SOLUTION IV 500 ML 0.9 NACL INJ EXCEL CONTAINER USP LF

## (undated) DEVICE — SUTURE PERMAHAND SZ 2-0 L30IN NONABSORBABLE BLK SILK W/O A305H

## (undated) DEVICE — X-RAY DETECTABLE SPONGES,16 PLY: Brand: VISTEC

## (undated) DEVICE — SUTURE VICRYL + SZ 3-0 L27IN ABSRB UD L26MM SH 1/2 CIR VCP416H

## (undated) DEVICE — SOLUTION IRRIG 1000ML H2O PIC PLAS SHATTERPROOF CONTAINER

## (undated) DEVICE — GARMENT,MEDLINE,DVT,INT,CALF,MED, GEN2: Brand: MEDLINE

## (undated) DEVICE — SOLUTION IRRIG 1000ML 09% SOD CHL USP PIC PLAS CONTAINER

## (undated) DEVICE — ORISE PROKNIFE 3.0 MM ELECTRODE: Brand: ORISE™ PROKNIFE

## (undated) DEVICE — ORISE PROKNIFE 1.5 MM ELECTRODE: Brand: ORISE™ PROKNIFE

## (undated) DEVICE — GLOVE SURG SZ 7 L12IN FNGR THK79MIL GRN LTX FREE